# Patient Record
Sex: FEMALE | Race: ASIAN | NOT HISPANIC OR LATINO | Employment: STUDENT | URBAN - METROPOLITAN AREA
[De-identification: names, ages, dates, MRNs, and addresses within clinical notes are randomized per-mention and may not be internally consistent; named-entity substitution may affect disease eponyms.]

---

## 2024-02-22 ENCOUNTER — TELEPHONE (OUTPATIENT)
Dept: OBSTETRICS AND GYNECOLOGY | Facility: CLINIC | Age: 29
End: 2024-02-22
Payer: COMMERCIAL

## 2024-02-22 DIAGNOSIS — O36.80X0 PREGNANCY WITH INCONCLUSIVE FETAL VIABILITY (HHS-HCC): ICD-10-CM

## 2024-02-22 NOTE — TELEPHONE ENCOUNTER
Patient is scheduled for a New Patient NOB appointment with Dr. Fatima on 3/13 @ 12:30pm. Her LMP was 12/12. I offered the patient an earlier appointment with a different doctor but they wanted to wait for Dr. Fatima. Patient is experiencing a lot of nausea so they are not sure what she can take for that. She also would like to know what kind of medicine she needs to be taking for pregnancy since this is her first time.     Patients , Antoine, does the talking for the patient as she has a hard time talking english, but can understand it.

## 2024-02-23 DIAGNOSIS — O21.9 NAUSEA AND VOMITING IN PREGNANCY PRIOR TO 22 WEEKS GESTATION (HHS-HCC): ICD-10-CM

## 2024-02-23 DIAGNOSIS — O36.80X0 PREGNANCY WITH INCONCLUSIVE FETAL VIABILITY, SINGLE OR UNSPECIFIED FETUS (HHS-HCC): Primary | ICD-10-CM

## 2024-02-23 RX ORDER — ASCORBIC ACID, CHOLECALCIFEROL, .ALPHA.-TOCOPHEROL, DL-, FOLIC ACID, PYRIDOXINE HYDROCHLORIDE, CYANOCOBALAMIN, CALCIUM FORMATE, FERROUS ASPARTO GLYCINATE, MAGNESIUM OXIDE AND DOCONEXENT 90; 400; 40; 1; 26; 25; 155; 18; 50; 300 MG/1; [IU]/1; [IU]/1; MG/1; MG/1; UG/1; MG/1; MG/1; MG/1; MG/1
1 CAPSULE, GELATIN COATED ORAL DAILY
Qty: 90 CAPSULE | Refills: 3 | Status: SHIPPED | OUTPATIENT
Start: 2024-02-23

## 2024-02-23 RX ORDER — DOXYLAMINE SUCCINATE AND PYRIDOXINE HYDROCHLORIDE, DELAYED RELEASE TABLETS 10 MG/10 MG 10; 10 MG/1; MG/1
2 TABLET, DELAYED RELEASE ORAL NIGHTLY
Qty: 100 TABLET | Refills: 3 | Status: SHIPPED | OUTPATIENT
Start: 2024-02-23

## 2024-02-23 NOTE — TELEPHONE ENCOUNTER
Spoke with patient's , states Carol is experiencing nausea without vomiting.  Discussed over the counter vitamin B6 100 mg and unisom one each at bedtime.  Discussed also over the counter prenatal vitamin.  He would like prescriptions sent for nausea as well as prenatal vitamin.  Pharmacy and allergies verified.

## 2024-02-28 ENCOUNTER — TELEPHONE (OUTPATIENT)
Dept: OBSTETRICS AND GYNECOLOGY | Facility: CLINIC | Age: 29
End: 2024-02-28
Payer: COMMERCIAL

## 2024-02-28 NOTE — TELEPHONE ENCOUNTER
Patient would like to know what dosage of folic acid she should be taking for her pregnancy. She would also like to know if this can be called in or if she needs to get it over the counter. Please advise.

## 2024-03-08 ENCOUNTER — HOSPITAL ENCOUNTER (OUTPATIENT)
Dept: RADIOLOGY | Facility: CLINIC | Age: 29
Discharge: HOME | End: 2024-03-08
Payer: COMMERCIAL

## 2024-03-08 DIAGNOSIS — Z34.90 UNCERTAIN DATES, ANTEPARTUM (HHS-HCC): ICD-10-CM

## 2024-03-08 DIAGNOSIS — O36.80X0 PREGNANCY WITH INCONCLUSIVE FETAL VIABILITY (HHS-HCC): ICD-10-CM

## 2024-03-08 PROCEDURE — 76801 OB US < 14 WKS SINGLE FETUS: CPT | Performed by: OBSTETRICS & GYNECOLOGY

## 2024-03-08 PROCEDURE — 76813 OB US NUCHAL MEAS 1 GEST: CPT

## 2024-03-08 PROCEDURE — 76801 OB US < 14 WKS SINGLE FETUS: CPT

## 2024-03-08 PROCEDURE — 76813 OB US NUCHAL MEAS 1 GEST: CPT | Performed by: OBSTETRICS & GYNECOLOGY

## 2024-03-13 ENCOUNTER — APPOINTMENT (OUTPATIENT)
Dept: OBSTETRICS AND GYNECOLOGY | Facility: CLINIC | Age: 29
End: 2024-03-13
Payer: COMMERCIAL

## 2024-03-13 ENCOUNTER — INITIAL PRENATAL (OUTPATIENT)
Dept: OBSTETRICS AND GYNECOLOGY | Facility: CLINIC | Age: 29
End: 2024-03-13
Payer: COMMERCIAL

## 2024-03-13 ENCOUNTER — LAB (OUTPATIENT)
Dept: LAB | Facility: LAB | Age: 29
End: 2024-03-13
Payer: COMMERCIAL

## 2024-03-13 VITALS
HEIGHT: 66 IN | DIASTOLIC BLOOD PRESSURE: 76 MMHG | BODY MASS INDEX: 23.14 KG/M2 | WEIGHT: 144 LBS | SYSTOLIC BLOOD PRESSURE: 118 MMHG

## 2024-03-13 DIAGNOSIS — Z3A.13 13 WEEKS GESTATION OF PREGNANCY (HHS-HCC): ICD-10-CM

## 2024-03-13 DIAGNOSIS — O09.612 YOUNG PRIMIGRAVIDA IN SECOND TRIMESTER (HHS-HCC): Primary | ICD-10-CM

## 2024-03-13 DIAGNOSIS — O09.612 YOUNG PRIMIGRAVIDA IN SECOND TRIMESTER (HHS-HCC): ICD-10-CM

## 2024-03-13 LAB
25(OH)D3 SERPL-MCNC: 25 NG/ML (ref 30–100)
ABO GROUP (TYPE) IN BLOOD: NORMAL
ALBUMIN SERPL BCP-MCNC: 4.3 G/DL (ref 3.4–5)
ALP SERPL-CCNC: 47 U/L (ref 33–110)
ALT SERPL W P-5'-P-CCNC: 11 U/L (ref 7–45)
ANION GAP SERPL CALC-SCNC: 9 MMOL/L (ref 10–20)
ANTIBODY SCREEN: NORMAL
AST SERPL W P-5'-P-CCNC: 13 U/L (ref 9–39)
BILIRUB SERPL-MCNC: 0.3 MG/DL (ref 0–1.2)
BUN SERPL-MCNC: 10 MG/DL (ref 6–23)
CALCIUM SERPL-MCNC: 8.7 MG/DL (ref 8.6–10.3)
CHLORIDE SERPL-SCNC: 105 MMOL/L (ref 98–107)
CO2 SERPL-SCNC: 25 MMOL/L (ref 21–32)
CREAT SERPL-MCNC: 0.6 MG/DL (ref 0.5–1.05)
EGFRCR SERPLBLD CKD-EPI 2021: >90 ML/MIN/1.73M*2
ERYTHROCYTE [DISTWIDTH] IN BLOOD BY AUTOMATED COUNT: 13.2 % (ref 11.5–14.5)
GLUCOSE SERPL-MCNC: 84 MG/DL (ref 74–99)
HCT VFR BLD AUTO: 40.8 % (ref 36–46)
HGB BLD-MCNC: 13.6 G/DL (ref 12–16)
MCH RBC QN AUTO: 30.4 PG (ref 26–34)
MCHC RBC AUTO-ENTMCNC: 33.3 G/DL (ref 32–36)
MCV RBC AUTO: 91 FL (ref 80–100)
NRBC BLD-RTO: 0 /100 WBCS (ref 0–0)
PLATELET # BLD AUTO: 245 X10*3/UL (ref 150–450)
POTASSIUM SERPL-SCNC: 4.1 MMOL/L (ref 3.5–5.3)
PROT SERPL-MCNC: 6.6 G/DL (ref 6.4–8.2)
RBC # BLD AUTO: 4.47 X10*6/UL (ref 4–5.2)
RH FACTOR (ANTIGEN D): NORMAL
SODIUM SERPL-SCNC: 135 MMOL/L (ref 136–145)
WBC # BLD AUTO: 7.9 X10*3/UL (ref 4.4–11.3)

## 2024-03-13 PROCEDURE — 36415 COLL VENOUS BLD VENIPUNCTURE: CPT

## 2024-03-13 PROCEDURE — 86850 RBC ANTIBODY SCREEN: CPT

## 2024-03-13 PROCEDURE — 88141 CYTOPATH C/V INTERPRET: CPT | Performed by: STUDENT IN AN ORGANIZED HEALTH CARE EDUCATION/TRAINING PROGRAM

## 2024-03-13 PROCEDURE — 86900 BLOOD TYPING SEROLOGIC ABO: CPT

## 2024-03-13 PROCEDURE — 85027 COMPLETE CBC AUTOMATED: CPT

## 2024-03-13 PROCEDURE — 0500F INITIAL PRENATAL CARE VISIT: CPT | Performed by: OBSTETRICS & GYNECOLOGY

## 2024-03-13 PROCEDURE — 87389 HIV-1 AG W/HIV-1&-2 AB AG IA: CPT

## 2024-03-13 PROCEDURE — 88175 CYTOPATH C/V AUTO FLUID REDO: CPT

## 2024-03-13 PROCEDURE — 86780 TREPONEMA PALLIDUM: CPT

## 2024-03-13 PROCEDURE — 86901 BLOOD TYPING SEROLOGIC RH(D): CPT

## 2024-03-13 PROCEDURE — 87086 URINE CULTURE/COLONY COUNT: CPT

## 2024-03-13 PROCEDURE — 86317 IMMUNOASSAY INFECTIOUS AGENT: CPT

## 2024-03-13 PROCEDURE — 80053 COMPREHEN METABOLIC PANEL: CPT

## 2024-03-13 PROCEDURE — 82306 VITAMIN D 25 HYDROXY: CPT

## 2024-03-13 PROCEDURE — 87624 HPV HI-RISK TYP POOLED RSLT: CPT

## 2024-03-13 PROCEDURE — 87800 DETECT AGNT MULT DNA DIREC: CPT

## 2024-03-13 PROCEDURE — 87340 HEPATITIS B SURFACE AG IA: CPT

## 2024-03-13 NOTE — PROGRESS NOTES
Subjective   Patient ID [id number]   Carol Ortez is a 28 y.o.  at 13w1d with a working estimated date of delivery of 2024, by Last Menstrual Period who presents for an initial prenatal visit. This pregnancy is planned.    Her pregnancy is complicated by:  none    OB History    Para Term  AB Living   1             SAB IAB Ectopic Multiple Live Births                  # Outcome Date GA Lbr Levi/2nd Weight Sex Delivery Anes PTL Lv   1 Current                   Objective   Physical Exam  Weight: 65.3 kg (144 lb)  Expected Total Weight Gain: 11.5 kg (25 lb)-16 kg (35 lb)   Pregravid BMI: 20.55  BP: 118/76          Physical Exam  Constitutional:       Appearance: Normal appearance.   Genitourinary:      Bladder, rectum and urethral meatus normal.      Right Labia: No rash or lesions.     Left Labia: No lesions or rash.     No vaginal discharge.      No vaginal prolapse present.     No vaginal atrophy present.       Right Adnexa: not tender and no mass present.     Left Adnexa: not tender and no mass present.     No cervical discharge or lesion.      Uterus is enlarged.      Pelvic exam was performed with patient in the lithotomy position.   Breasts:     Breasts are soft.     Right: Normal.      Left: Normal.   HENT:      Head: Normocephalic and atraumatic.      Nose: Nose normal.   Cardiovascular:      Rate and Rhythm: Normal rate and regular rhythm.      Heart sounds: Normal heart sounds.   Pulmonary:      Effort: Pulmonary effort is normal.      Breath sounds: Normal breath sounds.   Abdominal:      Palpations: Abdomen is soft.   Musculoskeletal:      Cervical back: Neck supple.   Neurological:      General: No focal deficit present.      Mental Status: She is alert and oriented to person, place, and time.   Skin:     General: Skin is warm and dry.      Findings: No rash.   Psychiatric:         Mood and Affect: Mood normal.       Prenatal Labs  Are ordered    Problem List Items Addressed  This Visit       Young primigravida in second trimester - Primary    Overview     Low risk pregnancy.   Desires NIPS. Normal 13 week early anatomy usn and NT.         13 weeks gestation of pregnancy        Prenatal Labs ordered. She also requests calciun and vitamin D levels since she was told she had low levels in the past.  Daily prenatal vitamins are recommended.  First trimester screening and second trimester screening discussed. Patient decided to proceed with NIPS.  Follow up in 4 weeks for return OB visit.

## 2024-03-14 PROBLEM — E55.9 VITAMIN D DEFICIENCY: Status: ACTIVE | Noted: 2024-03-14

## 2024-03-14 LAB
BACTERIA UR CULT: NO GROWTH
HBV SURFACE AG SERPL QL IA: NONREACTIVE
HIV 1+2 AB+HIV1 P24 AG SERPL QL IA: NONREACTIVE
REFLEX ADDED, ANEMIA PANEL: NORMAL
RUBV IGG SERPL IA-ACNC: 2.9 IA
RUBV IGG SERPL QL IA: POSITIVE
TREPONEMA PALLIDUM IGG+IGM AB [PRESENCE] IN SERUM OR PLASMA BY IMMUNOASSAY: NONREACTIVE

## 2024-03-15 LAB
C TRACH RRNA SPEC QL NAA+PROBE: NEGATIVE
N GONORRHOEA DNA SPEC QL PROBE+SIG AMP: NEGATIVE

## 2024-03-22 LAB — SCAN RESULT: NORMAL

## 2024-03-27 PROBLEM — R87.810 ASCUS WITH POSITIVE HIGH RISK HPV CERVICAL: Status: ACTIVE | Noted: 2024-03-27

## 2024-03-27 PROBLEM — R87.610 ASCUS WITH POSITIVE HIGH RISK HPV CERVICAL: Status: ACTIVE | Noted: 2024-03-27

## 2024-03-27 LAB
CYTOLOGY CMNT CVX/VAG CYTO-IMP: NORMAL
HPV HR 12 DNA GENITAL QL NAA+PROBE: POSITIVE
HPV HR GENOTYPES PNL CVX NAA+PROBE: POSITIVE
HPV16 DNA SPEC QL NAA+PROBE: NEGATIVE
HPV18 DNA SPEC QL NAA+PROBE: NEGATIVE
LAB AP HPV GENOTYPE QUESTION: YES
LAB AP HPV HR: NORMAL
LAB AP PAP ADDITIONAL TESTS: NORMAL
LABORATORY COMMENT REPORT: NORMAL
LMP START DATE: NORMAL
MENSTRUAL HX REPORTED: NORMAL
PATH REPORT.TOTAL CANCER: NORMAL

## 2024-04-12 PROBLEM — Z3A.18 18 WEEKS GESTATION OF PREGNANCY (HHS-HCC): Status: ACTIVE | Noted: 2024-03-13

## 2024-04-17 ENCOUNTER — ROUTINE PRENATAL (OUTPATIENT)
Dept: OBSTETRICS AND GYNECOLOGY | Facility: CLINIC | Age: 29
End: 2024-04-17
Payer: COMMERCIAL

## 2024-04-17 VITALS — SYSTOLIC BLOOD PRESSURE: 110 MMHG | BODY MASS INDEX: 24.11 KG/M2 | DIASTOLIC BLOOD PRESSURE: 78 MMHG | WEIGHT: 150 LBS

## 2024-04-17 DIAGNOSIS — R87.610 ASCUS WITH POSITIVE HIGH RISK HPV CERVICAL: Primary | ICD-10-CM

## 2024-04-17 DIAGNOSIS — Z3A.18 18 WEEKS GESTATION OF PREGNANCY (HHS-HCC): ICD-10-CM

## 2024-04-17 DIAGNOSIS — R87.810 ASCUS WITH POSITIVE HIGH RISK HPV CERVICAL: Primary | ICD-10-CM

## 2024-04-17 DIAGNOSIS — N76.0 BACTERIAL VAGINOSIS: ICD-10-CM

## 2024-04-17 DIAGNOSIS — B96.89 BACTERIAL VAGINOSIS: ICD-10-CM

## 2024-04-17 PROCEDURE — 57452 EXAM OF CERVIX W/SCOPE: CPT | Performed by: OBSTETRICS & GYNECOLOGY

## 2024-04-17 PROCEDURE — 0501F PRENATAL FLOW SHEET: CPT | Performed by: OBSTETRICS & GYNECOLOGY

## 2024-04-17 RX ORDER — METRONIDAZOLE 7.5 MG/G
GEL VAGINAL DAILY
Qty: 70 G | Refills: 0 | Status: SHIPPED | OUTPATIENT
Start: 2024-04-17 | End: 2024-04-22

## 2024-04-17 NOTE — PROGRESS NOTES
"Patient ID: Carol Ortez is a 29 y.o. female.    Colposcopy    Date/Time: 2024 4:05 PM    Performed by: Brianne Fatima MD  Authorized by: Brianne Fatima MD    Procedure location: cervix    Consent:     Patient questions answered: yes      Risks and benefits of the procedure and its alternatives discussed: yes      Procedural risks discussed:  Bleeding and infection    Consent obtained:  Written    Consent given by:  Patient  Indication:     Cervical indication(s): high-risk HPV positive and ASCUS    Pre-procedure:     Speculum was placed in the vagina: yes      Prep solution(s): acetic acid    Procedure:     Colposcopy with: colposcopy only      Cervix visibility: fully visualized      SCJ visibility: fully visualized      Lesion visualized: fully visualized      Acetowhite lesion(s): cervix      Acetowhite lesion(s) description:  11:00 small area of AWE  Post-procedure:     Patient tolerance of procedure:  Patient tolerated the procedure well with no immediate complications  Subjective   Patient ID [id number]   Carol Ortez is a 29 y.o.   at 18w1d with a working estimated date of delivery of 2024, by Last Menstrual Period who presents for a routine prenatal visit. She denies vaginal bleeding, leakage of fluid, decreased fetal movements, or contractions.  Colposcopy is planned for today due to ASCUS pap with HPV present.     Objective   Physical Exam  Weight: 68 kg (150 lb)  Expected Total Weight Gain: 11.5 kg (25 lb)-16 kg (35 lb)   Pregravid BMI: 20.55  BP: 110/78         Prenatal Labs  Urine dip:  No results found for: \"KETONESU\", \"GLUCOSEUR\", \"LEUKOCYTESUR\"    Lab Results   Component Value Date    HGB 13.6 2024    HCT 40.8 2024    ABO O 2024    HEPBSAG Nonreactive 2024         Problem List Items Addressed This Visit       [facility]    ASCUS with positive high risk HPV cervical - Primary    Overview     Pap 3/13/2024 " returned with ASCUS, HPV present when she was 13 weeks gestation.  Colposcopy is performed 4/17/2024.              Continue prenatal vitamin.  Labs reviewed.  Metrogel vaginal was prescribed for evidence of BV on pap. No biopsy was performed on cervix and pap is planned for postpartum.  Follow up as scheduled for a routine prenatal visit.

## 2024-04-26 ENCOUNTER — HOSPITAL ENCOUNTER (OUTPATIENT)
Dept: RADIOLOGY | Facility: CLINIC | Age: 29
Discharge: HOME | End: 2024-04-26
Payer: COMMERCIAL

## 2024-04-26 DIAGNOSIS — O36.80X0 PREGNANCY WITH INCONCLUSIVE FETAL VIABILITY (HHS-HCC): ICD-10-CM

## 2024-04-26 PROCEDURE — 76805 OB US >/= 14 WKS SNGL FETUS: CPT

## 2024-04-26 PROCEDURE — 76805 OB US >/= 14 WKS SNGL FETUS: CPT | Performed by: OBSTETRICS & GYNECOLOGY

## 2024-05-28 PROBLEM — Z3A.24 24 WEEKS GESTATION OF PREGNANCY (HHS-HCC): Status: ACTIVE | Noted: 2024-03-13

## 2024-05-29 ENCOUNTER — ROUTINE PRENATAL (OUTPATIENT)
Dept: OBSTETRICS AND GYNECOLOGY | Facility: CLINIC | Age: 29
End: 2024-05-29
Payer: COMMERCIAL

## 2024-05-29 ENCOUNTER — LAB (OUTPATIENT)
Dept: LAB | Facility: LAB | Age: 29
End: 2024-05-29
Payer: COMMERCIAL

## 2024-05-29 VITALS — BODY MASS INDEX: 26.84 KG/M2 | WEIGHT: 167 LBS | SYSTOLIC BLOOD PRESSURE: 120 MMHG | DIASTOLIC BLOOD PRESSURE: 80 MMHG

## 2024-05-29 DIAGNOSIS — Z34.02 PRIMIGRAVIDA IN SECOND TRIMESTER (HHS-HCC): ICD-10-CM

## 2024-05-29 DIAGNOSIS — O09.612 YOUNG PRIMIGRAVIDA IN SECOND TRIMESTER (HHS-HCC): Primary | ICD-10-CM

## 2024-05-29 DIAGNOSIS — Z3A.24 24 WEEKS GESTATION OF PREGNANCY (HHS-HCC): ICD-10-CM

## 2024-05-29 DIAGNOSIS — L30.8 OTHER ECZEMA: ICD-10-CM

## 2024-05-29 LAB
ERYTHROCYTE [DISTWIDTH] IN BLOOD BY AUTOMATED COUNT: 13.5 % (ref 11.5–14.5)
GLUCOSE 1H P 50 G GLC PO SERPL-MCNC: 122 MG/DL
HCT VFR BLD AUTO: 36.3 % (ref 36–46)
HGB BLD-MCNC: 12.3 G/DL (ref 12–16)
MCH RBC QN AUTO: 32 PG (ref 26–34)
MCHC RBC AUTO-ENTMCNC: 33.9 G/DL (ref 32–36)
MCV RBC AUTO: 95 FL (ref 80–100)
NRBC BLD-RTO: 0 /100 WBCS (ref 0–0)
PLATELET # BLD AUTO: 236 X10*3/UL (ref 150–450)
RBC # BLD AUTO: 3.84 X10*6/UL (ref 4–5.2)
WBC # BLD AUTO: 10.9 X10*3/UL (ref 4.4–11.3)

## 2024-05-29 PROCEDURE — 36415 COLL VENOUS BLD VENIPUNCTURE: CPT

## 2024-05-29 PROCEDURE — 0501F PRENATAL FLOW SHEET: CPT | Performed by: OBSTETRICS & GYNECOLOGY

## 2024-05-29 PROCEDURE — 82947 ASSAY GLUCOSE BLOOD QUANT: CPT

## 2024-05-29 PROCEDURE — 86780 TREPONEMA PALLIDUM: CPT

## 2024-05-29 PROCEDURE — 85027 COMPLETE CBC AUTOMATED: CPT

## 2024-05-29 RX ORDER — TRIAMCINOLONE ACETONIDE 0.25 MG/G
OINTMENT TOPICAL 2 TIMES DAILY
Qty: 15 G | Refills: 0 | Status: SHIPPED | OUTPATIENT
Start: 2024-05-29

## 2024-05-29 NOTE — PROGRESS NOTES
"Subjective   Patient ID [id number]   Carol Ortez is a 29 y.o.   at 24w1d with a working estimated date of delivery of 2024, by Last Menstrual Period who presents for a routine prenatal visit. She denies vaginal bleeding, leakage of fluid, decreased fetal movements, or contractions.  She has had an itchy area on the left areola for several months. This is not improved with moisturizer and she denies any rash or redness. We discussed trying a steroid ointment. She has had some low back pain, hip pain and possibly a hemorrhoid. We discussed need to assure adequate hydration, avoid constipation, and starting an exercise/stretching program may be helpful. We also discussed weight gain and recommendation to decrease carbohydrate intake to help reduce future weight gain. Many questions were answered.     Objective   Physical Exam  Weight: 75.8 kg (167 lb)  Expected Total Weight Gain: 11.5 kg (25 lb)-16 kg (35 lb)   Pregravid BMI: 20.55  BP: 120/80  Fetal Heart Rate: 150 Fundal Height (cm): 24 cm  Left lateral areola shows minimal thickening to skin with no erythema.   Prenatal Labs  Urine dip:  No results found for: \"KETONESU\", \"GLUCOSEUR\", \"LEUKOCYTESUR\"    Lab Results   Component Value Date    HGB 13.6 2024    HCT 40.8 2024    ABO O 2024    HEPBSAG Nonreactive 2024         Problem List Items Addressed This Visit       24 weeks gestation of pregnancy ([facility])    Young primigravida in second trimester ([facility]) - Primary    Overview     Low risk pregnancy.   NIPS returned risk reducing. Normal 13 week early anatomy usn and NT.          Other Visit Diagnoses       Primigravida in second trimester ([facility])        Relevant Orders    Glucose, 1 Hour Screen, Pregnancy    Syphilis Screen with Reflex    CBC Anemia Panel With Reflex,Pregnancy    Other eczema        Relevant Medications    triamcinolone (Kenalog) 0.025 % ointment             Continue prenatal vitamin.  Labs " reviewed.  Nain today.  Triamcinolone was prescribed to apply to rash/itchy area of left breast.   Follow up as scheduled for a routine prenatal visit.

## 2024-05-30 LAB
REFLEX ADDED, ANEMIA PANEL: NORMAL
TREPONEMA PALLIDUM IGG+IGM AB [PRESENCE] IN SERUM OR PLASMA BY IMMUNOASSAY: NONREACTIVE

## 2024-06-26 ENCOUNTER — APPOINTMENT (OUTPATIENT)
Dept: OBSTETRICS AND GYNECOLOGY | Facility: CLINIC | Age: 29
End: 2024-06-26
Payer: COMMERCIAL

## 2024-06-26 VITALS — BODY MASS INDEX: 27.16 KG/M2 | SYSTOLIC BLOOD PRESSURE: 122 MMHG | WEIGHT: 169 LBS | DIASTOLIC BLOOD PRESSURE: 88 MMHG

## 2024-06-26 DIAGNOSIS — Z3A.28 28 WEEKS GESTATION OF PREGNANCY (HHS-HCC): ICD-10-CM

## 2024-06-26 DIAGNOSIS — O09.612 YOUNG PRIMIGRAVIDA IN SECOND TRIMESTER (HHS-HCC): Primary | ICD-10-CM

## 2024-06-26 PROCEDURE — 0501F PRENATAL FLOW SHEET: CPT | Performed by: OBSTETRICS & GYNECOLOGY

## 2024-06-26 NOTE — PROGRESS NOTES
"Subjective   Patient ID [id number]   Carol Ortez is a 29 y.o.   at 28w1d with a working estimated date of delivery of 2024, by Last Menstrual Period who presents for a routine prenatal visit. She denies vaginal bleeding, leakage of fluid, decreased fetal movements, or contractions.  They wish to research Tdap prior to receiving. We also discussed delivery at [facility].    Objective   Physical Exam  Weight: 76.7 kg (169 lb)  Expected Total Weight Gain: 11.5 kg (25 lb)-16 kg (35 lb)   Pregravid BMI: 20.55  BP: 122/88         Prenatal Labs  Urine dip:  No results found for: \"KETONESU\", \"GLUCOSEUR\", \"LEUKOCYTESUR\"    Lab Results   Component Value Date    HGB 12.3 2024    HCT 36.3 2024    ABO O 2024    HEPBSAG Nonreactive 2024         Problem List Items Addressed This Visit       Young primigravida in second trimester ([facility]) - Primary    Overview     Low risk pregnancy.   NIPS returned risk reducing. Normal 13 week early anatomy usn and NT.         28 weeks gestation of pregnancy ([facility])    Overview     Glucola 122.             Continue prenatal vitamin.  Labs reviewed.    Follow up as scheduled for a routine prenatal visit.  "

## 2024-07-08 PROBLEM — Z3A.30 30 WEEKS GESTATION OF PREGNANCY (HHS-HCC): Status: ACTIVE | Noted: 2024-03-13

## 2024-07-10 ENCOUNTER — APPOINTMENT (OUTPATIENT)
Dept: OBSTETRICS AND GYNECOLOGY | Facility: CLINIC | Age: 29
End: 2024-07-10
Payer: COMMERCIAL

## 2024-07-10 ENCOUNTER — ROUTINE PRENATAL (OUTPATIENT)
Dept: OBSTETRICS AND GYNECOLOGY | Facility: CLINIC | Age: 29
End: 2024-07-10
Payer: COMMERCIAL

## 2024-07-10 VITALS — WEIGHT: 173 LBS | SYSTOLIC BLOOD PRESSURE: 110 MMHG | DIASTOLIC BLOOD PRESSURE: 78 MMHG | BODY MASS INDEX: 27.8 KG/M2

## 2024-07-10 DIAGNOSIS — O36.5990 POOR FETAL GROWTH AFFECTING MANAGEMENT OF MOTHER, ANTEPARTUM, SINGLE OR UNSPECIFIED FETUS (HHS-HCC): ICD-10-CM

## 2024-07-10 DIAGNOSIS — O09.613 YOUNG PRIMIGRAVIDA IN THIRD TRIMESTER (HHS-HCC): ICD-10-CM

## 2024-07-10 DIAGNOSIS — Z3A.30 30 WEEKS GESTATION OF PREGNANCY (HHS-HCC): Primary | ICD-10-CM

## 2024-07-10 PROCEDURE — 0501F PRENATAL FLOW SHEET: CPT | Performed by: OBSTETRICS & GYNECOLOGY

## 2024-07-10 PROCEDURE — 90471 IMMUNIZATION ADMIN: CPT | Performed by: OBSTETRICS & GYNECOLOGY

## 2024-07-10 PROCEDURE — 90715 TDAP VACCINE 7 YRS/> IM: CPT | Performed by: OBSTETRICS & GYNECOLOGY

## 2024-07-10 NOTE — PROGRESS NOTES
"Subjective   Patient ID [id number]   Carol Ortez is a 29 y.o.   at 30w1d with a working estimated date of delivery of 2024, by Last Menstrual Period who presents for a routine prenatal visit. She denies vaginal bleeding, leakage of fluid, decreased fetal movements, or contractions.  She notes some back pain but feels this is likely due to poor posture and body mechanics from studying most of the day. One time she stumbled and had acid reflux as a result, but she denies any ongoing issue with reflux.     Objective   Physical Exam  Weight: 78.5 kg (173 lb)  Expected Total Weight Gain: 11.5 kg (25 lb)-16 kg (35 lb)   Pregravid BMI: 20.55  BP: 110/78  Fetal Heart Rate: 140 Fundal Height (cm): 29 cm    Prenatal Labs  Urine dip:  No results found for: \"KETONESU\", \"GLUCOSEUR\", \"LEUKOCYTESUR\"    Lab Results   Component Value Date    HGB 12.3 2024    HCT 36.3 2024    ABO O 2024    HEPBSAG Nonreactive 2024         Problem List Items Addressed This Visit       Young primigravida in third trimester ([facility]) - Primary    Overview     Low risk pregnancy.   NIPS returned risk reducing. Normal 13 week early anatomy usn and NT.         SGA (small for gestational age), fetal, affecting care of mother, antepartum ([facility])    Overview     Will get EFW for SGA.         30 weeks gestation of pregnancy ([facility])    Overview     Glucola 122.             Continue prenatal vitamin.  Labs reviewed.  She declines pelvic floor PT for back pain and will try stretches and improved body mechanics.   Tdap today.  Follow up as scheduled for a routine prenatal visit.  "

## 2024-07-16 ENCOUNTER — HOSPITAL ENCOUNTER (OUTPATIENT)
Dept: RADIOLOGY | Facility: CLINIC | Age: 29
Discharge: HOME | End: 2024-07-16
Payer: COMMERCIAL

## 2024-07-16 DIAGNOSIS — O36.80X0 PREGNANCY WITH INCONCLUSIVE FETAL VIABILITY (HHS-HCC): ICD-10-CM

## 2024-07-16 PROCEDURE — 76816 OB US FOLLOW-UP PER FETUS: CPT

## 2024-07-16 PROCEDURE — 76816 OB US FOLLOW-UP PER FETUS: CPT | Performed by: OBSTETRICS & GYNECOLOGY

## 2024-07-23 PROBLEM — Z3A.32 32 WEEKS GESTATION OF PREGNANCY (HHS-HCC): Status: ACTIVE | Noted: 2024-03-13

## 2024-07-24 ENCOUNTER — ROUTINE PRENATAL (OUTPATIENT)
Dept: OBSTETRICS AND GYNECOLOGY | Facility: CLINIC | Age: 29
End: 2024-07-24
Payer: COMMERCIAL

## 2024-07-24 ENCOUNTER — APPOINTMENT (OUTPATIENT)
Dept: OBSTETRICS AND GYNECOLOGY | Facility: CLINIC | Age: 29
End: 2024-07-24
Payer: COMMERCIAL

## 2024-07-24 VITALS — SYSTOLIC BLOOD PRESSURE: 120 MMHG | WEIGHT: 177.5 LBS | DIASTOLIC BLOOD PRESSURE: 88 MMHG | BODY MASS INDEX: 28.53 KG/M2

## 2024-07-24 DIAGNOSIS — Z3A.32 32 WEEKS GESTATION OF PREGNANCY (HHS-HCC): ICD-10-CM

## 2024-07-24 DIAGNOSIS — O09.613 YOUNG PRIMIGRAVIDA IN THIRD TRIMESTER (HHS-HCC): Primary | ICD-10-CM

## 2024-07-24 PROBLEM — O36.5990 SGA (SMALL FOR GESTATIONAL AGE), FETAL, AFFECTING CARE OF MOTHER, ANTEPARTUM (HHS-HCC): Status: RESOLVED | Noted: 2024-07-10 | Resolved: 2024-07-24

## 2024-07-24 PROCEDURE — 0501F PRENATAL FLOW SHEET: CPT | Performed by: OBSTETRICS & GYNECOLOGY

## 2024-07-24 NOTE — PROGRESS NOTES
"Subjective   Patient ID [id number]   Carol Ortez is a 29 y.o.   at 32w1d with a working estimated date of delivery of 2024, by Last Menstrual Period who presents for a routine prenatal visit. She denies vaginal bleeding, leakage of fluid, decreased fetal movements, or contractions.  She feels a rhythmic movement at times. We discussed this could be hiccups.  She has a lesion on the labia for the past week. She feels this could be from shaving.        Objective   Physical Exam  Weight: 80.5 kg (177 lb 8 oz)  Expected Total Weight Gain: 11.5 kg (25 lb)-16 kg (35 lb)   Pregravid BMI: 20.55  BP: 120/88  Fetal Heart Rate: 140 Fundal Height (cm): 31 cm  Left labia majora has a raised round lesion with central defect with appearance most consistent with inflamed skin tag or raised furuncle.     Prenatal Labs  Urine dip:  No results found for: \"KETONESU\", \"GLUCOSEUR\", \"LEUKOCYTESUR\"    Lab Results   Component Value Date    HGB 12.3 2024    HCT 36.3 2024    ABO O 2024    HEPBSAG Nonreactive 2024         Problem List Items Addressed This Visit       Young primigravida in third trimester ([facility]) - Primary    Overview     Low risk pregnancy.   NIPS returned risk reducing. Normal 13 week early anatomy usn and NT.         32 weeks gestation of pregnancy ([facility])    Overview     Glucola 122.  31 week EFW 1808g, 60%.              Continue prenatal vitamin.  Labs reviewed.  She was advised to apply heat to the skin lesion. If worsening she will need to return for evaluation and possible excision or drainage.   Follow up as scheduled for a routine prenatal visit.  "

## 2024-08-07 ENCOUNTER — APPOINTMENT (OUTPATIENT)
Dept: OBSTETRICS AND GYNECOLOGY | Facility: CLINIC | Age: 29
End: 2024-08-07
Payer: COMMERCIAL

## 2024-08-08 ENCOUNTER — APPOINTMENT (OUTPATIENT)
Dept: OBSTETRICS AND GYNECOLOGY | Facility: CLINIC | Age: 29
End: 2024-08-08
Payer: COMMERCIAL

## 2024-08-12 ENCOUNTER — ROUTINE PRENATAL (OUTPATIENT)
Dept: OBSTETRICS AND GYNECOLOGY | Facility: CLINIC | Age: 29
End: 2024-08-12
Payer: COMMERCIAL

## 2024-08-12 VITALS — SYSTOLIC BLOOD PRESSURE: 116 MMHG | WEIGHT: 185 LBS | DIASTOLIC BLOOD PRESSURE: 82 MMHG | BODY MASS INDEX: 29.73 KG/M2

## 2024-08-12 DIAGNOSIS — Z3A.34 34 WEEKS GESTATION OF PREGNANCY (HHS-HCC): ICD-10-CM

## 2024-08-12 DIAGNOSIS — O36.8390 FETAL HEART RATE/RHYTHM ABNORMALITY AFFECTING MANAGEMENT OF MOTHER (HHS-HCC): Primary | ICD-10-CM

## 2024-08-12 DIAGNOSIS — O09.613 YOUNG PRIMIGRAVIDA IN THIRD TRIMESTER (HHS-HCC): ICD-10-CM

## 2024-08-12 PROCEDURE — 0501F PRENATAL FLOW SHEET: CPT | Performed by: OBSTETRICS & GYNECOLOGY

## 2024-08-12 PROCEDURE — 59025 FETAL NON-STRESS TEST: CPT | Performed by: OBSTETRICS & GYNECOLOGY

## 2024-08-12 NOTE — PROGRESS NOTES
"Subjective   Patient ID [id number]   Carol Ortez is a 29 y.o.  at 34w6d with a working estimated date of delivery of 2024, by Last Menstrual Period who presents for a routine prenatal visit. She denies vaginal bleeding, leakage of fluid, decreased fetal movements, or contractions.  She is here for NST today, due to complaint of fetal pulsation, approximately 50 to 60 bpm, 3-4 times a day, sometimes lasting for 20 minutes at a time.  She is unsure whether these are fetal hiccups.  She denies any other problems  Her pregnancy is uncomplicated       Objective   Physical Exam:   Weight: 83.9 kg (185 lb)  Expected Total Weight Gain: 11.5 kg (25 lb)-16 kg (35 lb)   Pregravid BMI: 20.55  BP: 116/82  Fetal Heart Rate: 138 Fundal Height (cm): 34 cm             Prenatal Labs  Urine Dip:  No results found for: \"KETONESU\", \"GLUCOSEUR\", \"LEUKOCYTESUR\"  Lab Results   Component Value Date    HGB 12.3 2024    HCT 36.3 2024    ABO O 2024    HEPBSAG Nonreactive 2024     No results found for: \"PAPPA\", \"AFP\", \"HCG\", \"ESTRIOL\", \"INHBA\"  No results found for: \"GLUF\", \"GLUT1\", \"QMYNWRE1ME\", \"FUJXVTV5GF\"    Imaging  The most recent ultrasound was performed on The most recent ultrasound study is not finalized with a study GA of The most recent ultrasound study is not finalized and EFW of The most recent ultrasound study is not finalized.  The most recent ultrasound study is not finalized  The most recent ultrasound study is not finalized    Assessment/Plan   Problem List Items Addressed This Visit             ICD-10-CM    Young primigravida in third trimester ([facility]) O09.613    34 weeks gestation of pregnancy ([facility]) Z3A.34    Fetal heart rate/rhythm abnormality affecting management of mother ([facility]) - Primary O36.8390    Relevant Orders    Fetal nonstress test, once     Continue prenatal vitamin.  RNST today, patient reassured  Expected mode of delivery   Follow up in 1 week for a routine " prenatal visit.

## 2024-08-12 NOTE — PROCEDURES
Carol Ortez, a  at 34w6d with an LUISA of 2024, by Last Menstrual Period, was seen at [facility] for a nonstress test.    Non-Stress Test   Baseline Fetal Heart Rate for Non-Stress Test: 138 BPM  Variability in Waveform for Non-Stress Test: Moderate  Accelerations in Non-Stress Test: Yes, greater than/equal to 15 bpm, lasting at least 15 seconds  Decelerations in Non-Stress Test: None  Contractions in Non-Stress Test: Not present  Acoustic Stimulator for Non-Stress Test: No  Interpretation of Non-Stress Test   Interpretation of Non-Stress Test: Reactive  NST for Multiple Fetuses: No

## 2024-08-14 ENCOUNTER — APPOINTMENT (OUTPATIENT)
Dept: OBSTETRICS AND GYNECOLOGY | Facility: CLINIC | Age: 29
End: 2024-08-14
Payer: COMMERCIAL

## 2024-08-19 PROBLEM — Z3A.36 36 WEEKS GESTATION OF PREGNANCY (HHS-HCC): Status: ACTIVE | Noted: 2024-03-13

## 2024-08-21 ENCOUNTER — APPOINTMENT (OUTPATIENT)
Dept: OBSTETRICS AND GYNECOLOGY | Facility: CLINIC | Age: 29
End: 2024-08-21
Payer: COMMERCIAL

## 2024-08-21 ENCOUNTER — HOSPITAL ENCOUNTER (OUTPATIENT)
Dept: RADIOLOGY | Facility: CLINIC | Age: 29
Discharge: HOME | End: 2024-08-21
Payer: COMMERCIAL

## 2024-08-21 ENCOUNTER — HOSPITAL ENCOUNTER (OUTPATIENT)
Facility: HOSPITAL | Age: 29
Discharge: HOME | End: 2024-08-21
Attending: OBSTETRICS & GYNECOLOGY | Admitting: OBSTETRICS & GYNECOLOGY
Payer: COMMERCIAL

## 2024-08-21 VITALS
SYSTOLIC BLOOD PRESSURE: 116 MMHG | RESPIRATION RATE: 18 BRPM | OXYGEN SATURATION: 99 % | WEIGHT: 185.96 LBS | BODY MASS INDEX: 29.19 KG/M2 | DIASTOLIC BLOOD PRESSURE: 77 MMHG | TEMPERATURE: 97.9 F | HEIGHT: 67 IN | HEART RATE: 71 BPM

## 2024-08-21 VITALS
WEIGHT: 186.5 LBS | SYSTOLIC BLOOD PRESSURE: 138 MMHG | DIASTOLIC BLOOD PRESSURE: 110 MMHG | BODY MASS INDEX: 29.97 KG/M2

## 2024-08-21 DIAGNOSIS — O36.80X0 PREGNANCY WITH INCONCLUSIVE FETAL VIABILITY (HHS-HCC): ICD-10-CM

## 2024-08-21 DIAGNOSIS — O09.613 YOUNG PRIMIGRAVIDA IN THIRD TRIMESTER (HHS-HCC): Primary | ICD-10-CM

## 2024-08-21 DIAGNOSIS — O13.3 GESTATIONAL HYPERTENSION, THIRD TRIMESTER (HHS-HCC): ICD-10-CM

## 2024-08-21 DIAGNOSIS — Z3A.36 36 WEEKS GESTATION OF PREGNANCY (HHS-HCC): ICD-10-CM

## 2024-08-21 PROBLEM — O36.8390: Status: RESOLVED | Noted: 2024-08-12 | Resolved: 2024-08-21

## 2024-08-21 LAB
ALBUMIN SERPL BCP-MCNC: 3.3 G/DL (ref 3.4–5)
ALP SERPL-CCNC: 150 U/L (ref 33–110)
ALT SERPL W P-5'-P-CCNC: 17 U/L (ref 7–45)
ANION GAP SERPL CALC-SCNC: 15 MMOL/L (ref 10–20)
AST SERPL W P-5'-P-CCNC: 21 U/L (ref 9–39)
BILIRUB SERPL-MCNC: 0.3 MG/DL (ref 0–1.2)
BUN SERPL-MCNC: 8 MG/DL (ref 6–23)
CALCIUM SERPL-MCNC: 8.9 MG/DL (ref 8.6–10.3)
CHLORIDE SERPL-SCNC: 105 MMOL/L (ref 98–107)
CO2 SERPL-SCNC: 19 MMOL/L (ref 21–32)
CREAT SERPL-MCNC: 0.57 MG/DL (ref 0.5–1.05)
CREAT UR-MCNC: 69.6 MG/DL (ref 20–320)
EGFRCR SERPLBLD CKD-EPI 2021: >90 ML/MIN/1.73M*2
ERYTHROCYTE [DISTWIDTH] IN BLOOD BY AUTOMATED COUNT: 13 % (ref 11.5–14.5)
GLUCOSE SERPL-MCNC: 65 MG/DL (ref 74–99)
HCT VFR BLD AUTO: 36.5 % (ref 36–46)
HGB BLD-MCNC: 12.1 G/DL (ref 12–16)
LDH SERPL L TO P-CCNC: 168 U/L (ref 84–246)
MCH RBC QN AUTO: 30.6 PG (ref 26–34)
MCHC RBC AUTO-ENTMCNC: 33.2 G/DL (ref 32–36)
MCV RBC AUTO: 92 FL (ref 80–100)
NRBC BLD-RTO: 0 /100 WBCS (ref 0–0)
PLATELET # BLD AUTO: 204 X10*3/UL (ref 150–450)
POTASSIUM SERPL-SCNC: 4.2 MMOL/L (ref 3.5–5.3)
PROT SERPL-MCNC: 6 G/DL (ref 6.4–8.2)
PROT UR-ACNC: 13 MG/DL (ref 5–24)
PROT/CREAT UR: 0.19 MG/MG CREAT (ref 0–0.17)
RBC # BLD AUTO: 3.95 X10*6/UL (ref 4–5.2)
SODIUM SERPL-SCNC: 135 MMOL/L (ref 136–145)
WBC # BLD AUTO: 10.3 X10*3/UL (ref 4.4–11.3)

## 2024-08-21 PROCEDURE — 76816 OB US FOLLOW-UP PER FETUS: CPT

## 2024-08-21 PROCEDURE — 76816 OB US FOLLOW-UP PER FETUS: CPT | Performed by: OBSTETRICS & GYNECOLOGY

## 2024-08-21 PROCEDURE — 85027 COMPLETE CBC AUTOMATED: CPT | Performed by: OBSTETRICS & GYNECOLOGY

## 2024-08-21 PROCEDURE — 83615 LACTATE (LD) (LDH) ENZYME: CPT | Performed by: OBSTETRICS & GYNECOLOGY

## 2024-08-21 PROCEDURE — 76819 FETAL BIOPHYS PROFIL W/O NST: CPT | Performed by: OBSTETRICS & GYNECOLOGY

## 2024-08-21 PROCEDURE — 82570 ASSAY OF URINE CREATININE: CPT | Performed by: OBSTETRICS & GYNECOLOGY

## 2024-08-21 PROCEDURE — 80053 COMPREHEN METABOLIC PANEL: CPT | Performed by: OBSTETRICS & GYNECOLOGY

## 2024-08-21 PROCEDURE — 87081 CULTURE SCREEN ONLY: CPT

## 2024-08-21 PROCEDURE — 99213 OFFICE O/P EST LOW 20 MIN: CPT | Performed by: OBSTETRICS & GYNECOLOGY

## 2024-08-21 PROCEDURE — 0501F PRENATAL FLOW SHEET: CPT | Performed by: OBSTETRICS & GYNECOLOGY

## 2024-08-21 PROCEDURE — 36415 COLL VENOUS BLD VENIPUNCTURE: CPT | Performed by: OBSTETRICS & GYNECOLOGY

## 2024-08-21 RX ORDER — LABETALOL HYDROCHLORIDE 5 MG/ML
20 INJECTION, SOLUTION INTRAVENOUS ONCE AS NEEDED
Status: DISCONTINUED | OUTPATIENT
Start: 2024-08-21 | End: 2024-08-21 | Stop reason: HOSPADM

## 2024-08-21 RX ORDER — ONDANSETRON HYDROCHLORIDE 2 MG/ML
4 INJECTION, SOLUTION INTRAVENOUS EVERY 6 HOURS PRN
Status: DISCONTINUED | OUTPATIENT
Start: 2024-08-21 | End: 2024-08-21 | Stop reason: HOSPADM

## 2024-08-21 RX ORDER — HYDRALAZINE HYDROCHLORIDE 20 MG/ML
5 INJECTION INTRAMUSCULAR; INTRAVENOUS ONCE AS NEEDED
Status: DISCONTINUED | OUTPATIENT
Start: 2024-08-21 | End: 2024-08-21 | Stop reason: HOSPADM

## 2024-08-21 RX ORDER — LIDOCAINE HYDROCHLORIDE 10 MG/ML
0.5 INJECTION, SOLUTION EPIDURAL; INFILTRATION; INTRACAUDAL; PERINEURAL ONCE AS NEEDED
Status: DISCONTINUED | OUTPATIENT
Start: 2024-08-21 | End: 2024-08-21 | Stop reason: HOSPADM

## 2024-08-21 RX ORDER — NIFEDIPINE 10 MG/1
10 CAPSULE ORAL ONCE AS NEEDED
Status: DISCONTINUED | OUTPATIENT
Start: 2024-08-21 | End: 2024-08-21 | Stop reason: HOSPADM

## 2024-08-21 RX ORDER — ACETAMINOPHEN 500 MG
1 TABLET ORAL 2 TIMES DAILY
Qty: 1 KIT | Refills: 0 | Status: SHIPPED | OUTPATIENT
Start: 2024-08-21

## 2024-08-21 RX ORDER — ONDANSETRON 4 MG/1
4 TABLET, FILM COATED ORAL EVERY 6 HOURS PRN
Status: DISCONTINUED | OUTPATIENT
Start: 2024-08-21 | End: 2024-08-21 | Stop reason: HOSPADM

## 2024-08-21 ASSESSMENT — PAIN SCALES - GENERAL: PAINLEVEL_OUTOF10: 0 - NO PAIN

## 2024-08-21 NOTE — H&P
Obstetrical Admission History and Physical     Carol Ortez is a 29 y.o.  at 36w1d. LUISA: 2024, by Last Menstrual Period. . She has had prenatal care with Brianne Rizzo .    Chief Complaint: Hypertension    Assessment/Plan    IUP at 36.1  Elevated BP in office- PEC labs pending.    Assessment & Plan        Pregnancy Problems (from 24 to present)       Problem Noted Resolved    Gestational hypertension, third trimester ([facility]) 2024 by Brianne Fatima MD No    Priority:  Medium      Overview Addendum 2024  4:04 PM by Brianne Fatima MD     Single elevated pressure at 36.1 weeks. Reviewed s/s of preeclampsia.   Will begin BP monitoring and obtain labs. Ultrasound today is reassuring.  36 week EFW 2797g, 45%.         Young primigravida in third trimester ([facility]) 3/13/2024 by Brianne Fatima MD No    Priority:  Medium      Overview Addendum 3/25/2024  3:51 PM by Brianne Fatima MD     Low risk pregnancy.   NIPS returned risk reducing. Normal 13 week early anatomy usn and NT.         36 weeks gestation of pregnancy ([facility]) 3/13/2024 by Brianne Fatima MD No    Priority:  Medium      Overview Addendum 2024  8:02 AM by Brianne Fatima MD     Glucola 122.  31 week EFW 1808g, 60%.          SGA (small for gestational age), fetal, affecting care of mother, antepartum ([facility]) 7/10/2024 by Brianne Fatima MD 2024 by Brianne Fatima MD    Priority:  Medium      Overview Addendum 2024 12:01 PM by Brianne Fatima MD     31 week EFW 1808g, 60%.                  Subjective   Dilfubessy is here complaining of high blood pressure.  Hypertension symptoms:  None    Good fetal movement. Denies vaginal bleeding., Denies contractions., Denies leaking of fluid.           Obstetrical History   OB History    Para Term  AB Living   1             SAB IAB Ectopic Multiple Live Births                  # Outcome Date GA Lbr Levi/2nd Weight Sex  Type Anes PTL Lv   1 Current                Past Medical History  Past Medical History:   Diagnosis Date    SGA (small for gestational age), fetal, affecting care of mother, antepartum ([facility]) 07/10/2024    31 week EFW 1808g, 60%.           Past Surgical History   No past surgical history on file.    Social History  Social History     Tobacco Use    Smoking status: Never    Smokeless tobacco: Never   Substance Use Topics    Alcohol use: Never     Substance and Sexual Activity   Drug Use Never       Allergies  Patient has no known allergies.     Medications  Medications Prior to Admission   Medication Sig Dispense Refill Last Dose    blood pressure monitor kit 1 Units 2 times a day. 1 kit 0     doxylamine-pyridoxine, vit B6, 10-10 mg tablet,delayed release (DR/EC) Take 2 tablets by mouth once daily at bedtime. You may add one tablet in the morning and one in the afternoon if nausea persists. 100 tablet 3     prenatal 78-iron-folate 1-dha (Prenate DHA, ferr asp glycin,) 18 mg iron-1 mg -300 mg capsule Take 1 capsule by mouth once daily. 90 capsule 3     triamcinolone (Kenalog) 0.025 % ointment Apply topically 2 times a day. 15 g 0        Objective    Last Vitals  Temp Pulse Resp BP MAP O2 Sat   36.6 °C (97.9 °F) 71 18 116/77 92 99 %     Physical Examination  GENERAL: Examination reveals a well developed, well nourished, gravid female in no acute distress. She is alert and cooperative.  LUNGS:  breathing unlabored  ABDOMEN: soft, gravid, nontender, nondistended, no abnormal masses, no epigastric pain  FHR is 140 , with accels  , and a cat 1  tracing.    [place] reading: q2-5- pt unaware   EXTREMITIES: no redness or tenderness in the calves or thighs, edema 1+  NEUROLOGICAL: alert, oriented, normal speech, no focal findings or movement disorder noted  PSYCHOLOGICAL: awake and alert; oriented to person, place, and time    Lab Review  Labs in chart were reviewed.

## 2024-08-21 NOTE — DISCHARGE INSTRUCTIONS
"Obstetrical Triage Note    Reason for Triage Observation: Hypertension    Assessment   hypertension  Triage Testing revealed normal BP's and labs   Patient's complaints have stayed the same.    Plan   Discharge home.     Subjective   History of Present Pregnancy  Carol Ortez is a 29 y.o.  gravid, female. Patient's last menstrual period was 2023. with an Estimated Date of Delivery of 2024, by Last Menstrual Period, who is now 36w1d gestation. The patient's blood type is O POS.       Objective   Recent Vital Signs:                                 /77   Pulse 71   Temp 36.6 °C (97.9 °F) (Temporal)   Resp 18   Ht 1.7 m (5' 6.93\")   Wt 84.4 kg (185 lb 15.3 oz)   BMI 29.19 kg/m²     BP & Temp Min/Max Last 24 Hours:     BP  Min: 113/77   Min taken time: 24 1601  Max: 138/110   Max taken time: 24 1416  Temp  Av.6 °C (97.9 °F)  Min: 36.6 °C (97.9 °F)   Min taken time: 24 1518  Max: 36.6 °C (97.9 °F)   Max taken time: 24 1518    Physical Examination:  GENERAL: Examination reveals a well developed, well nourished, gravid female in no acute distress. She is alert and cooperative.    Lab Review:   Lab Results   Component Value Date    WBC 10.3 2024    HGB 12.1 2024    HCT 36.5 2024     2024     No results found for: \"NA\", \"K\", \"CL\", \"CO2\", \"BUN\", \"CREATININE\", \"GLU\"  Lab Results   Component Value Date    UTPCR 0.19 (H) 2024     "

## 2024-08-21 NOTE — PROGRESS NOTES
"Subjective   Patient ID [id number]   Carol Ortez is a 29 y.o.   at 36w1d with a working estimated date of delivery of 2024, by Last Menstrual Period who presents for a routine prenatal visit. She denies vaginal bleeding, leakage of fluid, decreased fetal movements, or contractions.  S/S of preeclampsia were reviewed. She notes some mild edema of feet and denies other symptoms. USN report is pending from prior to visit but EFW is AGA. She agrees to begin monitoring BP and to obtain baseline labs.     Objective   Physical Exam  Weight: 84.6 kg (186 lb 8 oz)  Expected Total Weight Gain: 11.5 kg (25 lb)-16 kg (35 lb)   Pregravid BMI: 20.55  BP: (!) 138/110 and 138/100 prior.  Fetal Heart Rate: 140 Fundal Height (cm): 36 cm    Prenatal Labs  Urine dip:  No results found for: \"KETONESU\", \"GLUCOSEUR\", \"LEUKOCYTESUR\"    Lab Results   Component Value Date    HGB 12.3 2024    HCT 36.3 2024    ABO O 2024    HEPBSAG Nonreactive 2024         Problem List Items Addressed This Visit       Young primigravida in third trimester ([facility]) - Primary    Overview     Low risk pregnancy.   NIPS returned risk reducing. Normal 13 week early anatomy usn and NT.         Gestational hypertension, third trimester ([facility])    Overview     Single elevated pressure at 36.1 weeks. Reviewed s/s of preeclampsia.   Will begin BP monitoring and obtain labs. Ultrasound today is reassuring.         Relevant Medications    blood pressure monitor kit    36 weeks gestation of pregnancy ([facility])    Overview     Glucola 122.  31 week EFW 1808g, 60%.          Relevant Medications    blood pressure monitor kit        Continue prenatal vitamin.  Labs reviewed.  GBBS is obtained.  Will send to labor and delivery for preeclampsia evaluation. BP monitor is prescribed and BP log is given.   Follow up in one week if discharged home from labor and delivery.  "

## 2024-08-25 LAB — GP B STREP GENITAL QL CULT: NORMAL

## 2024-08-28 ENCOUNTER — APPOINTMENT (OUTPATIENT)
Dept: OBSTETRICS AND GYNECOLOGY | Facility: CLINIC | Age: 29
End: 2024-08-28
Payer: COMMERCIAL

## 2024-08-28 ENCOUNTER — LAB (OUTPATIENT)
Dept: LAB | Facility: LAB | Age: 29
End: 2024-08-28
Payer: COMMERCIAL

## 2024-08-28 VITALS — WEIGHT: 191 LBS | BODY MASS INDEX: 29.98 KG/M2 | SYSTOLIC BLOOD PRESSURE: 120 MMHG | DIASTOLIC BLOOD PRESSURE: 90 MMHG

## 2024-08-28 DIAGNOSIS — O13.3 GESTATIONAL HYPERTENSION, THIRD TRIMESTER (HHS-HCC): ICD-10-CM

## 2024-08-28 DIAGNOSIS — Z3A.37 37 WEEKS GESTATION OF PREGNANCY (HHS-HCC): ICD-10-CM

## 2024-08-28 DIAGNOSIS — O13.3 GESTATIONAL HYPERTENSION, THIRD TRIMESTER (HHS-HCC): Primary | ICD-10-CM

## 2024-08-28 LAB
ALBUMIN SERPL BCP-MCNC: 3.2 G/DL (ref 3.4–5)
ALP SERPL-CCNC: 146 U/L (ref 33–110)
ALT SERPL W P-5'-P-CCNC: 13 U/L (ref 7–45)
ANION GAP SERPL CALC-SCNC: 12 MMOL/L (ref 10–20)
AST SERPL W P-5'-P-CCNC: 17 U/L (ref 9–39)
BILIRUB SERPL-MCNC: 0.3 MG/DL (ref 0–1.2)
BUN SERPL-MCNC: 8 MG/DL (ref 6–23)
CALCIUM SERPL-MCNC: 8.4 MG/DL (ref 8.6–10.3)
CHLORIDE SERPL-SCNC: 106 MMOL/L (ref 98–107)
CO2 SERPL-SCNC: 23 MMOL/L (ref 21–32)
CREAT SERPL-MCNC: 0.71 MG/DL (ref 0.5–1.05)
EGFRCR SERPLBLD CKD-EPI 2021: >90 ML/MIN/1.73M*2
ERYTHROCYTE [DISTWIDTH] IN BLOOD BY AUTOMATED COUNT: 13 % (ref 11.5–14.5)
GLUCOSE SERPL-MCNC: 96 MG/DL (ref 74–99)
HCT VFR BLD AUTO: 36.1 % (ref 36–46)
HGB BLD-MCNC: 11.6 G/DL (ref 12–16)
MCH RBC QN AUTO: 30.1 PG (ref 26–34)
MCHC RBC AUTO-ENTMCNC: 32.1 G/DL (ref 32–36)
MCV RBC AUTO: 94 FL (ref 80–100)
NRBC BLD-RTO: 0 /100 WBCS (ref 0–0)
PLATELET # BLD AUTO: 200 X10*3/UL (ref 150–450)
POTASSIUM SERPL-SCNC: 4.8 MMOL/L (ref 3.5–5.3)
PROT SERPL-MCNC: 5.4 G/DL (ref 6.4–8.2)
RBC # BLD AUTO: 3.86 X10*6/UL (ref 4–5.2)
SODIUM SERPL-SCNC: 136 MMOL/L (ref 136–145)
WBC # BLD AUTO: 8.3 X10*3/UL (ref 4.4–11.3)

## 2024-08-28 PROCEDURE — 0501F PRENATAL FLOW SHEET: CPT | Performed by: OBSTETRICS & GYNECOLOGY

## 2024-08-28 PROCEDURE — 80053 COMPREHEN METABOLIC PANEL: CPT

## 2024-08-28 PROCEDURE — 85027 COMPLETE CBC AUTOMATED: CPT

## 2024-08-28 PROCEDURE — 59025 FETAL NON-STRESS TEST: CPT | Performed by: OBSTETRICS & GYNECOLOGY

## 2024-08-28 PROCEDURE — 36415 COLL VENOUS BLD VENIPUNCTURE: CPT

## 2024-08-28 NOTE — PROGRESS NOTES
"Subjective   Patient ID [id number]   Carol Ortez is a 29 y.o.   at 37w1d with a working estimated date of delivery of 2024, by Last Menstrual Period who presents for a routine prenatal visit. She denies vaginal bleeding, leakage of fluid, decreased fetal movements, or contractions.  She was evaluated at labor and delivery last week for elevated BP with normal pressures and labs. BP log is reviewed today with normal pressures. She does admit to increased edema of legs over the past few days. She denies headache, vision change, abdominal pain or nausea.  We reviewed that diagnosis of gestational hypertension is now confirmed with second documented elevated BP. We reviewed recommendation for delivery now given that she is 37.1 weeks pregnant. She is not willing to proceed with induction at this time and desires to continue close monitoring of BP and s/s of preeclampsia.     Objective   Physical Exam  Weight: 86.6 kg (191 lb)  Expected Total Weight Gain: 11.5 kg (25 lb)-16 kg (35 lb)   Pregravid BMI: 20.07  BP: 120/90  Fetal Heart Rate: 140 Fundal Height (cm): 37 cm  NST is reactive.    Prenatal Labs  Urine dip:  No results found for: \"KETONESU\", \"GLUCOSEUR\", \"LEUKOCYTESUR\"    Lab Results   Component Value Date    HGB 12.1 2024    HCT 36.5 2024    ABO O 2024    HEPBSAG Nonreactive 2024         Problem List Items Addressed This Visit       Gestational hypertension, third trimester ([facility]) - Primary    Overview     Single elevated pressure at 36.1 weeks with second at 37.1. Reviewed s/s of preeclampsia.   Home BP log is in target range.  36 week EFW 2797g, 45%.         Relevant Orders    Fetal nonstress test, once    37 weeks gestation of pregnancy ([facility])    Overview     Glucola 122.  31 week EFW 1808g, 60%.          Relevant Orders    Fetal nonstress test, once        Continue prenatal vitamin.  Labs reviewed.  Induction is recommended but declined by patient. We will " continue with weekly labs and twice weekly AP testing. She agrees to consider recommendation for induction and to call if she will allow us to schedule induction.  Follow up as scheduled for a routine prenatal visit.

## 2024-08-28 NOTE — PROCEDURES
Carol Ortez, a  at 37w1d with an LUISA of 2024, by Last Menstrual Period, was seen at [facility] [place] for a nonstress test.    Non-Stress Test   Baseline Fetal Heart Rate for Non-Stress Test: 140 BPM  Variability in Waveform for Non-Stress Test: Moderate  Accelerations in Non-Stress Test: greater than/equal to 15 bpm, lasting at least 15 seconds, Yes  Decelerations in Non-Stress Test: None  Contractions in Non-Stress Test: Irregular  Acoustic Stimulator for Non-Stress Test: No  Interpretation of Non-Stress Test   Interpretation of Non-Stress Test: Reactive

## 2024-08-29 LAB — REFLEX ADDED, ANEMIA PANEL: NORMAL

## 2024-09-03 ENCOUNTER — APPOINTMENT (OUTPATIENT)
Dept: OBSTETRICS AND GYNECOLOGY | Facility: CLINIC | Age: 29
End: 2024-09-03
Payer: COMMERCIAL

## 2024-09-03 ENCOUNTER — HOSPITAL ENCOUNTER (OUTPATIENT)
Dept: RADIOLOGY | Facility: CLINIC | Age: 29
Discharge: HOME | End: 2024-09-03
Payer: COMMERCIAL

## 2024-09-03 ENCOUNTER — ANESTHESIA EVENT (OUTPATIENT)
Dept: OBSTETRICS AND GYNECOLOGY | Facility: HOSPITAL | Age: 29
End: 2024-09-03
Payer: COMMERCIAL

## 2024-09-03 ENCOUNTER — ANESTHESIA (OUTPATIENT)
Dept: OBSTETRICS AND GYNECOLOGY | Facility: HOSPITAL | Age: 29
End: 2024-09-03
Payer: COMMERCIAL

## 2024-09-03 ENCOUNTER — HOSPITAL ENCOUNTER (INPATIENT)
Facility: HOSPITAL | Age: 29
LOS: 3 days | Discharge: HOME | End: 2024-09-06
Attending: OBSTETRICS & GYNECOLOGY | Admitting: ADVANCED PRACTICE MIDWIFE
Payer: COMMERCIAL

## 2024-09-03 VITALS — BODY MASS INDEX: 29.98 KG/M2 | DIASTOLIC BLOOD PRESSURE: 100 MMHG | SYSTOLIC BLOOD PRESSURE: 130 MMHG | WEIGHT: 191 LBS

## 2024-09-03 DIAGNOSIS — O13.3 GESTATIONAL HYPERTENSION, THIRD TRIMESTER (HHS-HCC): ICD-10-CM

## 2024-09-03 DIAGNOSIS — O36.80X0 PREGNANCY WITH INCONCLUSIVE FETAL VIABILITY (HHS-HCC): ICD-10-CM

## 2024-09-03 DIAGNOSIS — Z3A.38 38 WEEKS GESTATION OF PREGNANCY (HHS-HCC): ICD-10-CM

## 2024-09-03 DIAGNOSIS — O13.3 GESTATIONAL HYPERTENSION, THIRD TRIMESTER (HHS-HCC): Primary | ICD-10-CM

## 2024-09-03 LAB
ABO GROUP (TYPE) IN BLOOD: NORMAL
ALBUMIN SERPL BCP-MCNC: 3.3 G/DL (ref 3.4–5)
ALP SERPL-CCNC: 174 U/L (ref 33–110)
ALT SERPL W P-5'-P-CCNC: 15 U/L (ref 7–45)
ANION GAP SERPL CALC-SCNC: 13 MMOL/L (ref 10–20)
ANTIBODY SCREEN: NORMAL
AST SERPL W P-5'-P-CCNC: 21 U/L (ref 9–39)
BILIRUB SERPL-MCNC: 0.3 MG/DL (ref 0–1.2)
BUN SERPL-MCNC: 9 MG/DL (ref 6–23)
CALCIUM SERPL-MCNC: 8.9 MG/DL (ref 8.6–10.3)
CHLORIDE SERPL-SCNC: 106 MMOL/L (ref 98–107)
CO2 SERPL-SCNC: 21 MMOL/L (ref 21–32)
CREAT SERPL-MCNC: 0.57 MG/DL (ref 0.5–1.05)
CREAT UR-MCNC: 220.4 MG/DL (ref 20–320)
EGFRCR SERPLBLD CKD-EPI 2021: >90 ML/MIN/1.73M*2
ERYTHROCYTE [DISTWIDTH] IN BLOOD BY AUTOMATED COUNT: 13.2 % (ref 11.5–14.5)
GLUCOSE SERPL-MCNC: 64 MG/DL (ref 74–99)
HCT VFR BLD AUTO: 36.7 % (ref 36–46)
HGB BLD-MCNC: 12.1 G/DL (ref 12–16)
MCH RBC QN AUTO: 30.2 PG (ref 26–34)
MCHC RBC AUTO-ENTMCNC: 33 G/DL (ref 32–36)
MCV RBC AUTO: 92 FL (ref 80–100)
NRBC BLD-RTO: 0 /100 WBCS (ref 0–0)
PLATELET # BLD AUTO: 185 X10*3/UL (ref 150–450)
POTASSIUM SERPL-SCNC: 4.1 MMOL/L (ref 3.5–5.3)
PROT SERPL-MCNC: 5.6 G/DL (ref 6.4–8.2)
PROT UR-ACNC: 35 MG/DL (ref 5–24)
PROT/CREAT UR: 0.16 MG/MG CREAT (ref 0–0.17)
RBC # BLD AUTO: 4.01 X10*6/UL (ref 4–5.2)
RH FACTOR (ANTIGEN D): NORMAL
SODIUM SERPL-SCNC: 136 MMOL/L (ref 136–145)
TREPONEMA PALLIDUM IGG+IGM AB [PRESENCE] IN SERUM OR PLASMA BY IMMUNOASSAY: NONREACTIVE
WBC # BLD AUTO: 10.3 X10*3/UL (ref 4.4–11.3)

## 2024-09-03 PROCEDURE — 86901 BLOOD TYPING SEROLOGIC RH(D): CPT | Performed by: ADVANCED PRACTICE MIDWIFE

## 2024-09-03 PROCEDURE — 85027 COMPLETE CBC AUTOMATED: CPT | Performed by: ADVANCED PRACTICE MIDWIFE

## 2024-09-03 PROCEDURE — 76815 OB US LIMITED FETUS(S): CPT | Performed by: MEDICAL GENETICS

## 2024-09-03 PROCEDURE — 82570 ASSAY OF URINE CREATININE: CPT | Performed by: OBSTETRICS & GYNECOLOGY

## 2024-09-03 PROCEDURE — 76819 FETAL BIOPHYS PROFIL W/O NST: CPT

## 2024-09-03 PROCEDURE — 36415 COLL VENOUS BLD VENIPUNCTURE: CPT | Performed by: OBSTETRICS & GYNECOLOGY

## 2024-09-03 PROCEDURE — 86850 RBC ANTIBODY SCREEN: CPT | Performed by: ADVANCED PRACTICE MIDWIFE

## 2024-09-03 PROCEDURE — 86780 TREPONEMA PALLIDUM: CPT | Mod: AHULAB | Performed by: ADVANCED PRACTICE MIDWIFE

## 2024-09-03 PROCEDURE — 0501F PRENATAL FLOW SHEET: CPT | Performed by: OBSTETRICS & GYNECOLOGY

## 2024-09-03 PROCEDURE — 1220000001 HC OB SEMI-PRIVATE ROOM DAILY

## 2024-09-03 PROCEDURE — 76819 FETAL BIOPHYS PROFIL W/O NST: CPT | Performed by: MEDICAL GENETICS

## 2024-09-03 PROCEDURE — 76815 OB US LIMITED FETUS(S): CPT

## 2024-09-03 PROCEDURE — 80053 COMPREHEN METABOLIC PANEL: CPT | Performed by: OBSTETRICS & GYNECOLOGY

## 2024-09-03 PROCEDURE — 36415 COLL VENOUS BLD VENIPUNCTURE: CPT | Performed by: ADVANCED PRACTICE MIDWIFE

## 2024-09-03 PROCEDURE — 2500000004 HC RX 250 GENERAL PHARMACY W/ HCPCS (ALT 636 FOR OP/ED): Performed by: OBSTETRICS & GYNECOLOGY

## 2024-09-03 RX ORDER — LABETALOL HYDROCHLORIDE 5 MG/ML
20 INJECTION, SOLUTION INTRAVENOUS ONCE AS NEEDED
Status: DISCONTINUED | OUTPATIENT
Start: 2024-09-03 | End: 2024-09-05

## 2024-09-03 RX ORDER — TRANEXAMIC ACID 100 MG/ML
1000 INJECTION, SOLUTION INTRAVENOUS ONCE AS NEEDED
Status: DISCONTINUED | OUTPATIENT
Start: 2024-09-03 | End: 2024-09-05

## 2024-09-03 RX ORDER — METHYLERGONOVINE MALEATE 0.2 MG/ML
0.2 INJECTION INTRAVENOUS ONCE AS NEEDED
Status: DISCONTINUED | OUTPATIENT
Start: 2024-09-03 | End: 2024-09-05

## 2024-09-03 RX ORDER — OXYTOCIN 10 [USP'U]/ML
10 INJECTION, SOLUTION INTRAMUSCULAR; INTRAVENOUS ONCE AS NEEDED
Status: DISCONTINUED | OUTPATIENT
Start: 2024-09-03 | End: 2024-09-05

## 2024-09-03 RX ORDER — OXYTOCIN/0.9 % SODIUM CHLORIDE 30/500 ML
2-30 PLASTIC BAG, INJECTION (ML) INTRAVENOUS CONTINUOUS
Status: DISCONTINUED | OUTPATIENT
Start: 2024-09-03 | End: 2024-09-05

## 2024-09-03 RX ORDER — METOCLOPRAMIDE HYDROCHLORIDE 5 MG/ML
10 INJECTION INTRAMUSCULAR; INTRAVENOUS EVERY 6 HOURS PRN
Status: DISCONTINUED | OUTPATIENT
Start: 2024-09-03 | End: 2024-09-05

## 2024-09-03 RX ORDER — CARBOPROST TROMETHAMINE 250 UG/ML
250 INJECTION, SOLUTION INTRAMUSCULAR ONCE AS NEEDED
Status: DISCONTINUED | OUTPATIENT
Start: 2024-09-03 | End: 2024-09-05

## 2024-09-03 RX ORDER — LOPERAMIDE HYDROCHLORIDE 2 MG/1
4 CAPSULE ORAL EVERY 2 HOUR PRN
Status: DISCONTINUED | OUTPATIENT
Start: 2024-09-03 | End: 2024-09-05

## 2024-09-03 RX ORDER — SODIUM CHLORIDE, SODIUM LACTATE, POTASSIUM CHLORIDE, CALCIUM CHLORIDE 600; 310; 30; 20 MG/100ML; MG/100ML; MG/100ML; MG/100ML
125 INJECTION, SOLUTION INTRAVENOUS CONTINUOUS
Status: DISCONTINUED | OUTPATIENT
Start: 2024-09-03 | End: 2024-09-05

## 2024-09-03 RX ORDER — ONDANSETRON 4 MG/1
4 TABLET, FILM COATED ORAL EVERY 6 HOURS PRN
Status: DISCONTINUED | OUTPATIENT
Start: 2024-09-03 | End: 2024-09-05

## 2024-09-03 RX ORDER — TERBUTALINE SULFATE 1 MG/ML
0.25 INJECTION SUBCUTANEOUS ONCE AS NEEDED
Status: DISCONTINUED | OUTPATIENT
Start: 2024-09-03 | End: 2024-09-05

## 2024-09-03 RX ORDER — OXYTOCIN/0.9 % SODIUM CHLORIDE 30/500 ML
60 PLASTIC BAG, INJECTION (ML) INTRAVENOUS ONCE AS NEEDED
Status: COMPLETED | OUTPATIENT
Start: 2024-09-03 | End: 2024-09-05

## 2024-09-03 RX ORDER — HYDRALAZINE HYDROCHLORIDE 20 MG/ML
5 INJECTION INTRAMUSCULAR; INTRAVENOUS ONCE AS NEEDED
Status: DISCONTINUED | OUTPATIENT
Start: 2024-09-03 | End: 2024-09-05

## 2024-09-03 RX ORDER — LIDOCAINE HYDROCHLORIDE 10 MG/ML
30 INJECTION INFILTRATION; PERINEURAL ONCE AS NEEDED
Status: DISCONTINUED | OUTPATIENT
Start: 2024-09-03 | End: 2024-09-05

## 2024-09-03 RX ORDER — NIFEDIPINE 10 MG/1
10 CAPSULE ORAL ONCE AS NEEDED
Status: DISCONTINUED | OUTPATIENT
Start: 2024-09-03 | End: 2024-09-05

## 2024-09-03 RX ORDER — ONDANSETRON HYDROCHLORIDE 2 MG/ML
4 INJECTION, SOLUTION INTRAVENOUS EVERY 6 HOURS PRN
Status: DISCONTINUED | OUTPATIENT
Start: 2024-09-03 | End: 2024-09-05

## 2024-09-03 RX ORDER — METOCLOPRAMIDE 10 MG/1
10 TABLET ORAL EVERY 6 HOURS PRN
Status: DISCONTINUED | OUTPATIENT
Start: 2024-09-03 | End: 2024-09-05

## 2024-09-03 RX ORDER — MISOPROSTOL 200 UG/1
800 TABLET ORAL ONCE AS NEEDED
Status: DISCONTINUED | OUTPATIENT
Start: 2024-09-03 | End: 2024-09-05

## 2024-09-03 SDOH — SOCIAL STABILITY: SOCIAL INSECURITY: ARE THERE ANY APPARENT SIGNS OF INJURIES/BEHAVIORS THAT COULD BE RELATED TO ABUSE/NEGLECT?: NO

## 2024-09-03 SDOH — SOCIAL STABILITY: SOCIAL INSECURITY: ARE YOU OR HAVE YOU BEEN THREATENED OR ABUSED PHYSICALLY, EMOTIONALLY, OR SEXUALLY BY ANYONE?: NO

## 2024-09-03 SDOH — SOCIAL STABILITY: SOCIAL INSECURITY: VERBAL ABUSE: DENIES

## 2024-09-03 SDOH — SOCIAL STABILITY: SOCIAL INSECURITY: PHYSICAL ABUSE: DENIES

## 2024-09-03 SDOH — ECONOMIC STABILITY: HOUSING INSECURITY: DO YOU FEEL UNSAFE GOING BACK TO THE PLACE WHERE YOU ARE LIVING?: NO

## 2024-09-03 SDOH — HEALTH STABILITY: MENTAL HEALTH: NON-SPECIFIC ACTIVE SUICIDAL THOUGHTS (PAST 1 MONTH): NO

## 2024-09-03 SDOH — HEALTH STABILITY: MENTAL HEALTH: WERE YOU ABLE TO COMPLETE ALL THE BEHAVIORAL HEALTH SCREENINGS?: YES

## 2024-09-03 SDOH — SOCIAL STABILITY: SOCIAL INSECURITY: HAVE YOU HAD THOUGHTS OF HARMING ANYONE ELSE?: NO

## 2024-09-03 SDOH — HEALTH STABILITY: MENTAL HEALTH: SUICIDAL BEHAVIOR (LIFETIME): NO

## 2024-09-03 SDOH — HEALTH STABILITY: MENTAL HEALTH: WISH TO BE DEAD (PAST 1 MONTH): NO

## 2024-09-03 SDOH — SOCIAL STABILITY: SOCIAL INSECURITY: ABUSE SCREEN: ADULT

## 2024-09-03 SDOH — HEALTH STABILITY: MENTAL HEALTH: CURRENT SMOKER: 0

## 2024-09-03 SDOH — SOCIAL STABILITY: SOCIAL INSECURITY: HAVE YOU HAD ANY THOUGHTS OF HARMING ANYONE ELSE?: NO

## 2024-09-03 SDOH — SOCIAL STABILITY: SOCIAL INSECURITY: HAS ANYONE EVER THREATENED TO HURT YOUR FAMILY OR YOUR PETS?: NO

## 2024-09-03 SDOH — SOCIAL STABILITY: SOCIAL INSECURITY: DOES ANYONE TRY TO KEEP YOU FROM HAVING/CONTACTING OTHER FRIENDS OR DOING THINGS OUTSIDE YOUR HOME?: NO

## 2024-09-03 SDOH — SOCIAL STABILITY: SOCIAL INSECURITY: DO YOU FEEL ANYONE HAS EXPLOITED OR TAKEN ADVANTAGE OF YOU FINANCIALLY OR OF YOUR PERSONAL PROPERTY?: NO

## 2024-09-03 ASSESSMENT — LIFESTYLE VARIABLES
HOW MANY STANDARD DRINKS CONTAINING ALCOHOL DO YOU HAVE ON A TYPICAL DAY: PATIENT DOES NOT DRINK
HOW OFTEN DO YOU HAVE A DRINK CONTAINING ALCOHOL: NEVER
AUDIT-C TOTAL SCORE: 0
HOW OFTEN DO YOU HAVE 6 OR MORE DRINKS ON ONE OCCASION: NEVER
AUDIT-C TOTAL SCORE: 0
SKIP TO QUESTIONS 9-10: 1

## 2024-09-03 ASSESSMENT — ACTIVITIES OF DAILY LIVING (ADL): LACK_OF_TRANSPORTATION: NO

## 2024-09-03 ASSESSMENT — PATIENT HEALTH QUESTIONNAIRE - PHQ9
2. FEELING DOWN, DEPRESSED OR HOPELESS: NOT AT ALL
1. LITTLE INTEREST OR PLEASURE IN DOING THINGS: NOT AT ALL
SUM OF ALL RESPONSES TO PHQ9 QUESTIONS 1 & 2: 0

## 2024-09-03 ASSESSMENT — PAIN SCALES - GENERAL
PAINLEVEL_OUTOF10: 0 - NO PAIN
PAINLEVEL_OUTOF10: 4

## 2024-09-03 NOTE — ANESTHESIA PREPROCEDURE EVALUATION
Patient: Carol Ortez    Evaluation Method: In-person visit    Procedure Information    Date: 09/03/24  Procedure: Labor Consult         Relevant Problems   Anesthesia (within normal limits)      Cardiac   (+) Gestational hypertension w/o significant proteinuria in 3rd trimester ([facility])      Pulmonary (within normal limits)      Neuro (within normal limits)      GI (within normal limits)      /Renal (within normal limits)      Liver (within normal limits)      Endocrine (within normal limits)      Hematology (within normal limits)      Musculoskeletal (within normal limits)      HEENT (within normal limits)      ID (within normal limits)      Skin (within normal limits)      GYN   (+) 38 weeks gestation of pregnancy ([facility])       Clinical information reviewed:   Tobacco  Allergies  Meds   Med Hx  Surg Hx   Fam Hx  Soc Hx        NPO Detail:  No data recorded     OB/Gyn Evaluation    Present Pregnancy    Patient is pregnant now.   Obstetric History                Physical Exam    Airway  Mallampati: II  TM distance: >3 FB  Neck ROM: full     Cardiovascular - normal exam     Dental - normal exam     Pulmonary - normal exam     Abdominal - normal exam             Anesthesia Plan    History of general anesthesia?: no  History of complications of general anesthesia?: unknown/emergency    ASA 2     epidural     The patient is not a current smoker.    Anesthetic plan and risks discussed with patient.  Use of blood products discussed with patient who consented to blood products.    Plan discussed with CRNA.

## 2024-09-03 NOTE — PROGRESS NOTES
"Subjective   Patient ID [id number]   Carol Ortez is a 29 y.o.   at 38w0d with a working estimated date of delivery of 2024, by Last Menstrual Period who presents for a routine prenatal visit. She denies vaginal bleeding, leakage of fluid, decreased fetal movements, or contractions.  Home BP is in normal range. She again will not initially agree to induction today, but = after further discussion she is willing to present to labor and delivery for induction. She denies headache, abdominal pain, vision change, nausea, but she does have edema of feet. Baby is active. BPP report is pending.     Objective   Physical Exam  Weight: 86.6 kg (191 lb)  Expected Total Weight Gain: 11.5 kg (25 lb)-16 kg (35 lb)   Pregravid BMI: 20.07  BP: (!) 130/100  Fetal Heart Rate: 160 Fundal Height (cm): 37 cm  Ft/70/-2    Prenatal Labs  Urine dip:  No results found for: \"KETONESU\", \"GLUCOSEUR\", \"LEUKOCYTESUR\"    Lab Results   Component Value Date    HGB 11.6 (L) 2024    HCT 36.1 2024    ABO O 2024    HEPBSAG Nonreactive 2024         Problem List Items Addressed This Visit       Gestational hypertension, third trimester ([facility]) - Primary    Overview     Single elevated pressure at 36.1 weeks with second at 37.1. Reviewed s/s of preeclampsia.   Home BP log is in target range.  36 week EFW 2797g, 45%.         38 weeks gestation of pregnancy ([facility])    Overview     Glucola 122.  31 week EFW 1808g, 60%.              Continue prenatal vitamin.  Labs reviewed.  We discussed interiano bulb induction with possible cytotec or pitocin.    "

## 2024-09-03 NOTE — H&P
Note Type:  OB Admission H&P    ASSESSMENT & PLAN: Carol Ortez is a 29 y.o.  at 38w0d. LUISA: 2024, by Last Menstrual Period.     Diagnosis: Gestational Hypertension  -Diagnosed based on: elevated blood pressures  -Blood pressure goal <160/110  -Short acting medications received? No   -Long acting antihypertensive: None  -Preeclampsia labs: Pending    Plan  -Admit to L&D, consented  -CRB inserted without difficulty, patient tolerated procedure well  -GBS prophylaxis: not indicated    Fetal Status  -NST reactive, reassuring   -Presentation Cephalic based on bedside ultrasound  -EFW 6lbs 14oz by [place]  -1hr Normal  -GBS Negative      Pregnancy Problems (from 24 to present)       Problem Noted Resolved    Gestational hypertension w/o significant proteinuria in 3rd trimester ([facility]) 9/3/2024 by BK Persaud-KOKO No    Priority:  Medium      Gestational hypertension, third trimester ([facility]) 2024 by Brianne Fatima MD No    Priority:  Medium      Overview Addendum 2024  8:25 AM by Brianne Fatima MD     Single elevated pressure at 36.1 weeks with second at 37.1. Reviewed s/s of preeclampsia.   Home BP log is in target range.  36 week EFW 2797g, 45%.         Young primigravida in third trimester ([facility]) 3/13/2024 by Brianne Fatima MD No    Priority:  Medium      Overview Addendum 3/25/2024  3:51 PM by Brianne Fatima MD     Low risk pregnancy.   NIPS returned risk reducing. Normal 13 week early anatomy usn and NT.         38 weeks gestation of pregnancy ([facility]) 3/13/2024 by Brianne Fatima MD No    Priority:  Medium      Overview Addendum 2024  8:02 AM by Brianne Fatima MD     Glucola 122.  31 week EFW 1808g, 60%.          SGA (small for gestational age), fetal, affecting care of mother, antepartum ([facility]) 7/10/2024 by Brianne Fatima MD 2024 by Brianne Fatima MD    Overview Addendum 2024 12:01 PM by Brianne BETTENCOURT  MD Kushal     31 week EFW 1808g, 60%.                  Subjective   This is a 28yo G1 at 38w presents for induction of  labor due to gestational hypertension.  She denies HA, vision changes, Ruq pain.  This pregnancy has also been notable for:  ASCUS +HPV pap      Prenatal Provider Dr. Fatima    OB History    Para Term  AB Living   1 0 0 0 0 0   SAB IAB Ectopic Multiple Live Births   0 0 0 0 0      # Outcome Date GA Lbr Levi/2nd Weight Sex Type Anes PTL Lv   1 Current                History reviewed. No pertinent surgical history.    Social History     Tobacco Use    Smoking status: Never    Smokeless tobacco: Never   Substance Use Topics    Alcohol use: Never       No Known Allergies    Medications Prior to Admission   Medication Sig Dispense Refill Last Dose    blood pressure monitor kit 1 Units 2 times a day. 1 kit 0     doxylamine-pyridoxine, vit B6, 10-10 mg tablet,delayed release (DR/EC) Take 2 tablets by mouth once daily at bedtime. You may add one tablet in the morning and one in the afternoon if nausea persists. 100 tablet 3     prenatal 78-iron-folate 1-dha (Prenate DHA, ferr asp glycin,) 18 mg iron-1 mg -300 mg capsule Take 1 capsule by mouth once daily. 90 capsule 3     triamcinolone (Kenalog) 0.025 % ointment Apply topically 2 times a day. 15 g 0      Objective     Last Vitals  Temp Pulse Resp BP MAP O2 Sat   36.7 °C (98.1 °F) 76 18 131/81 99 99 %     Blood Pressures         9/3/2024  1349 9/3/2024  1527          BP: 123/78 131/81               Physical Exam  General: NAD, mood appropriate  Cardiopulmonary: warm and well perfused, breathing comfortably on room air  Abdomen: Gravid, non-tender  Extremities: full range of motion  Cervix: 1 /0 /-2      Fetal Monitoring  Baseline: 140s bpm, Variability: moderate,  Accelerations: present and Decelerations: none    Uterine Activity: Contractions present    Interpretation: Reactive    Bedside ultrasound: Yes      Results from last 7 days   Lab  Units 09/03/24  1559 08/28/24  0925   WBC AUTO x10*3/uL 10.3 8.3   HEMOGLOBIN g/dL 12.1 11.6*   HEMATOCRIT % 36.7 36.1   PLATELETS AUTO x10*3/uL 185 200   AST U/L  --  17   ALT U/L  --  13   CREATININE mg/dL  --  0.71        Prenatal labs reviewed, not remarkable.

## 2024-09-04 ENCOUNTER — ANESTHESIA (OUTPATIENT)
Dept: OBSTETRICS AND GYNECOLOGY | Facility: HOSPITAL | Age: 29
End: 2024-09-04
Payer: COMMERCIAL

## 2024-09-04 ENCOUNTER — APPOINTMENT (OUTPATIENT)
Dept: OBSTETRICS AND GYNECOLOGY | Facility: CLINIC | Age: 29
End: 2024-09-04
Payer: COMMERCIAL

## 2024-09-04 ENCOUNTER — ANESTHESIA EVENT (OUTPATIENT)
Dept: OBSTETRICS AND GYNECOLOGY | Facility: HOSPITAL | Age: 29
End: 2024-09-04
Payer: COMMERCIAL

## 2024-09-04 PROCEDURE — 59410 OBSTETRICAL CARE: CPT | Performed by: OBSTETRICS & GYNECOLOGY

## 2024-09-04 PROCEDURE — 2500000004 HC RX 250 GENERAL PHARMACY W/ HCPCS (ALT 636 FOR OP/ED): Performed by: ADVANCED PRACTICE MIDWIFE

## 2024-09-04 PROCEDURE — 2500000004 HC RX 250 GENERAL PHARMACY W/ HCPCS (ALT 636 FOR OP/ED): Performed by: NURSE ANESTHETIST, CERTIFIED REGISTERED

## 2024-09-04 PROCEDURE — 1220000001 HC OB SEMI-PRIVATE ROOM DAILY

## 2024-09-04 PROCEDURE — 51701 INSERT BLADDER CATHETER: CPT

## 2024-09-04 PROCEDURE — 59409 OBSTETRICAL CARE: CPT | Performed by: OBSTETRICS & GYNECOLOGY

## 2024-09-04 PROCEDURE — 3E033VJ INTRODUCTION OF OTHER HORMONE INTO PERIPHERAL VEIN, PERCUTANEOUS APPROACH: ICD-10-PCS | Performed by: OBSTETRICS & GYNECOLOGY

## 2024-09-04 PROCEDURE — 3700000014 EPIDURAL BLOCK: Performed by: NURSE ANESTHETIST, CERTIFIED REGISTERED

## 2024-09-04 PROCEDURE — 2500000004 HC RX 250 GENERAL PHARMACY W/ HCPCS (ALT 636 FOR OP/ED): Performed by: OBSTETRICS & GYNECOLOGY

## 2024-09-04 RX ORDER — ACETAMINOPHEN 325 MG/1
650 TABLET ORAL ONCE
Status: COMPLETED | OUTPATIENT
Start: 2024-09-04 | End: 2024-09-04

## 2024-09-04 RX ORDER — FENTANYL/ROPIVACAINE/NS/PF 2MCG/ML-.2
0-25 PLASTIC BAG, INJECTION (ML) INJECTION CONTINUOUS
Status: DISCONTINUED | OUTPATIENT
Start: 2024-09-04 | End: 2024-09-05

## 2024-09-04 SDOH — HEALTH STABILITY: MENTAL HEALTH: CURRENT SMOKER: 0

## 2024-09-04 ASSESSMENT — PAIN SCALES - GENERAL
PAINLEVEL_OUTOF10: 0 - NO PAIN
PAINLEVEL_OUTOF10: 5 - MODERATE PAIN

## 2024-09-04 NOTE — PROGRESS NOTES
Cervix remains unchanged  Patient declining AROM at this time  Cat I tracing  Pitocin at 12 mu/min  Patient to discuss with her partner whether she is amenable to AROM and epidural, and let us know

## 2024-09-04 NOTE — PROGRESS NOTES
Intrapartum Progress Note    Assessment/Plan   Carol Ortez is a 29 y.o.  at 38w1d. LUISA: 2024, by Last Menstrual Period.   CRB expelled  Continue pitocin per protocol  Anticipate vaginal delivery    Assessment & Plan  Gestational hypertension w/o significant proteinuria in 3rd trimester ([facility])    Pregnancy Problems (from 24 to present)       Problem Noted Resolved    Gestational hypertension w/o significant proteinuria in 3rd trimester ([facility]) 9/3/2024 by DANUTA Persaud No    Priority:  Medium      Gestational hypertension, third trimester ([facility]) 2024 by Brianne Fatima MD No    Priority:  Medium      Overview Addendum 2024  8:25 AM by Brianne Fatima MD     Single elevated pressure at 36.1 weeks with second at 37.1. Reviewed s/s of preeclampsia.   Home BP log is in target range.  36 week EFW 2797g, 45%.         Young primigravida in third trimester ([facility]) 3/13/2024 by Brianne Fatima MD No    Priority:  Medium      Overview Addendum 3/25/2024  3:51 PM by Brianne Fatima MD     Low risk pregnancy.   NIPS returned risk reducing. Normal 13 week early anatomy usn and NT.         38 weeks gestation of pregnancy ([facility]) 3/13/2024 by Brianne Fatima MD No    Priority:  Medium      Overview Addendum 2024  8:02 AM by Brianne Fatima MD     Glucola 122.  31 week EFW 1808g, 60%.          SGA (small for gestational age), fetal, affecting care of mother, antepartum ([facility]) 7/10/2024 by Brianne Fatima MD 2024 by Brianne Fatima MD    Priority:  Medium      Overview Addendum 2024 12:01 PM by Brianne Fatima MD     31 week EFW 1808g, 60%.                  Subjective   CRB expelled  Feeling more pain with contractions    Objective   Last Vitals:  Temp Pulse Resp BP MAP Pulse Ox   36.8 °C (98.2 °F) 76 16 107/59 76 99 %     Vitals Min/Max Last 24 Hours:  Temp  Min: 36.7 °C (98.1 °F)  Max: 37.2 °C (99 °F)  Pulse  Min: 76   Max: 122  Resp  Min: 16  Max: 18  BP  Min: 107/59  Max: 132/85  MAP (mmHg)  Min: 76  Max: 103    Intake/Output:  No intake or output data in the 24 hours ending 09/04/24 0051    Physical Examination:  [place]: contractions are irregular every 2 to 5 minutes  FHT: 130s, moderate variability, CAT 1 tracing  SVE: 6/70%/-2    Lab Review:  Labs in chart were reviewed.

## 2024-09-04 NOTE — PROGRESS NOTES
Intrapartum Progress Note    Subjective   Comfortable with epidural. Not feeling contractions at all    Objective   Last Vitals:  Temp Pulse Resp BP MAP Pulse Ox   36.4 °C (97.5 °F) 107 16 122/83   100 %     Physical Examination:  , mod grace, +accels, rare variable decels  [place]: q 2-3, pit @12mu/min  VE: def'd    Lab Review:  Lab Results   Component Value Date    WBC 10.3 2024    HGB 12.1 2024    HCT 36.7 2024     2024     Lab Results   Component Value Date    GLUCOSE 64 (L) 2024     2024    K 4.1 2024     2024    CO2 21 2024    ANIONGAP 13 2024    BUN 9 2024    CREATININE 0.57 2024    EGFR >90 2024    CALCIUM 8.9 2024    ALBUMIN 3.3 (L) 2024    PROT 5.6 (L) 2024    ALKPHOS 174 (H) 2024    ALT 15 2024    AST 21 2024    BILITOT 0.3 2024     0   Lab Value Date/Time    UTPCR 0.16 2024 1707    UTPCR 0.19 (H) 2024 1609     Assessment   Carol Ortez is a 29 y.o.  at 38w1d  IOL, active phase  Ghtn, stable  Fht cat 2, overall reassuring for variability and accels  Gbs negative    Plan   Continue current course  Anticipate

## 2024-09-04 NOTE — ANESTHESIA PREPROCEDURE EVALUATION
Patient: Carol Ortez    Evaluation Method: In-person visit    Procedure Information    Date: 09/04/24  Procedure: Labor Analgesia         Relevant Problems   Cardiac   (+) Gestational hypertension w/o significant proteinuria in 3rd trimester ([facility])      GYN   (+) 38 weeks gestation of pregnancy ([facility])       Clinical information reviewed:   Tobacco  Allergies  Meds   Med Hx  Surg Hx   Fam Hx  Soc Hx        NPO Detail:  No data recorded     OB/GYN     Physical Exam    Airway  Mallampati: II     Cardiovascular - normal exam     Dental - normal exam     Pulmonary - normal exam     Abdominal - normal exam             Anesthesia Plan    History of general anesthesia?: no  History of complications of general anesthesia?: no    ASA 2     epidural     The patient is not a current smoker.    Anesthetic plan and risks discussed with patient and spouse.

## 2024-09-04 NOTE — PROGRESS NOTES
Pt SROM for clear fluid around 1700.  Contractions are now painful and occurring every 2 minutes.   SVE 8/100/-1  Pt standing at bedside breathing through contractions.   Pt wishes to try nitrous

## 2024-09-04 NOTE — PROGRESS NOTES
Late entry    Discussion of options with patient and .   She is aggie now every 2-4 minutes and states they are worse than menstrual cramps but tolerable.   Discussed pros/cons of AROM vs continuing with pit alone at this time.     Pt wishes to continue pit only and will continue position changes. She would like NCB but is open to other options as labor progresses.    FHT's 145 with accels and moderate variability

## 2024-09-04 NOTE — ANESTHESIA PROCEDURE NOTES
Epidural Block    Patient location during procedure: OB  Start time: 9/4/2024 6:17 PM  End time: 9/4/2024 6:21 PM  Reason for block: labor analgesia  Staffing  Performed: CRNA   Authorized by: ROJAS Branham    Performed by: ROJAS Branham    Preanesthetic Checklist  Completed: patient identified, IV checked, risks and benefits discussed, surgical consent, pre-op evaluation, timeout performed and sterile techniques followed  Block Timeout  RN/Licensed healthcare professional reads aloud to the Anesthesia provider and entire team: Patient identity, procedure with side and site, patient position, and as applicable the availability of implants/special equipment/special requirements.  Patient on coagulant treatment: no  Timeout performed at: 9/4/2024 6:15 PM  Block Placement  Patient position: sitting  Prep: ChloraPrep  Sterility prep: mask, gloves, drape, cap and hand  Sedation level: no sedation  Patient monitoring: heart rate, continuous pulse oximetry and blood pressure  Approach: midline  Local numbing: lidocaine 1% to skin and subcutaneous tissues  Vertebral space: lumbar  Lumbar location: L3-L4  Epidural  Loss of resistance technique: saline  Guidance: landmark technique        Needle  Needle type: Tuohy   Needle gauge: 17  Needle length: 9 cm  Needle insertion depth: 6 cm  Catheter size: 19 G  Catheter at skin depth: 13 cm  Catheter securement method: clear occlusive dressing    Test dose: lidocaine 1.5% with epinephrine 1-to-200,000  Test dose: lidocaine 1.5% with epinephrine 1-to-200,000  Test dose result: no positive test dose    PCEA  Medication concentration used: 0.2% Ropivacaine with 2 mcg/mL Fentanyl  Dose (mL): 5  Lockout (minutes): 30  1-Hour Limit (boluses/hr): 2  Basal Rate: 12        Assessment  Sensory level: T10 bilateral  Block outcome: pain improved  Number of attempts: 1  Events: no positive test dose  Procedure assessment: patient tolerated procedure well with no  immediate complications

## 2024-09-04 NOTE — PROGRESS NOTES
Pt not feeling contractions.   Pit at 10mu  SVE 6/80/-1 posterior  Attempted AROM with no fluid return- will attempt with next check/ poss FSE   Ctxs- q2-3  FHT's 135 w early decels noted, accels present

## 2024-09-05 ENCOUNTER — PHARMACY VISIT (OUTPATIENT)
Dept: PHARMACY | Facility: CLINIC | Age: 29
End: 2024-09-05
Payer: COMMERCIAL

## 2024-09-05 PROCEDURE — 2500000005 HC RX 250 GENERAL PHARMACY W/O HCPCS: Performed by: OBSTETRICS & GYNECOLOGY

## 2024-09-05 PROCEDURE — 7210000002 HC LABOR PER HOUR

## 2024-09-05 PROCEDURE — 51701 INSERT BLADDER CATHETER: CPT

## 2024-09-05 PROCEDURE — 2500000004 HC RX 250 GENERAL PHARMACY W/ HCPCS (ALT 636 FOR OP/ED): Performed by: ADVANCED PRACTICE MIDWIFE

## 2024-09-05 PROCEDURE — 1100000001 HC PRIVATE ROOM DAILY

## 2024-09-05 PROCEDURE — 59050 FETAL MONITOR W/REPORT: CPT

## 2024-09-05 PROCEDURE — 2500000001 HC RX 250 WO HCPCS SELF ADMINISTERED DRUGS (ALT 637 FOR MEDICARE OP): Performed by: OBSTETRICS & GYNECOLOGY

## 2024-09-05 PROCEDURE — 88307 TISSUE EXAM BY PATHOLOGIST: CPT | Mod: TC,AHULAB | Performed by: OBSTETRICS & GYNECOLOGY

## 2024-09-05 PROCEDURE — 7100000016 HC LABOR RECOVERY PER HOUR

## 2024-09-05 PROCEDURE — 0KQM0ZZ REPAIR PERINEUM MUSCLE, OPEN APPROACH: ICD-10-PCS | Performed by: OBSTETRICS & GYNECOLOGY

## 2024-09-05 PROCEDURE — RXMED WILLOW AMBULATORY MEDICATION CHARGE

## 2024-09-05 PROCEDURE — 2500000004 HC RX 250 GENERAL PHARMACY W/ HCPCS (ALT 636 FOR OP/ED): Performed by: OBSTETRICS & GYNECOLOGY

## 2024-09-05 RX ORDER — MISOPROSTOL 200 UG/1
800 TABLET ORAL ONCE AS NEEDED
Status: DISCONTINUED | OUTPATIENT
Start: 2024-09-05 | End: 2024-09-06 | Stop reason: HOSPADM

## 2024-09-05 RX ORDER — CARBOPROST TROMETHAMINE 250 UG/ML
250 INJECTION, SOLUTION INTRAMUSCULAR ONCE AS NEEDED
Status: DISCONTINUED | OUTPATIENT
Start: 2024-09-05 | End: 2024-09-06 | Stop reason: HOSPADM

## 2024-09-05 RX ORDER — ADHESIVE BANDAGE
10 BANDAGE TOPICAL
Status: DISCONTINUED | OUTPATIENT
Start: 2024-09-05 | End: 2024-09-06 | Stop reason: HOSPADM

## 2024-09-05 RX ORDER — POLYETHYLENE GLYCOL 3350 17 G/17G
17 POWDER, FOR SOLUTION ORAL 2 TIMES DAILY PRN
Status: DISCONTINUED | OUTPATIENT
Start: 2024-09-05 | End: 2024-09-06 | Stop reason: HOSPADM

## 2024-09-05 RX ORDER — ONDANSETRON 4 MG/1
4 TABLET, FILM COATED ORAL EVERY 6 HOURS PRN
Status: DISCONTINUED | OUTPATIENT
Start: 2024-09-05 | End: 2024-09-06 | Stop reason: HOSPADM

## 2024-09-05 RX ORDER — OXYTOCIN 10 [USP'U]/ML
10 INJECTION, SOLUTION INTRAMUSCULAR; INTRAVENOUS ONCE AS NEEDED
Status: DISCONTINUED | OUTPATIENT
Start: 2024-09-05 | End: 2024-09-06 | Stop reason: HOSPADM

## 2024-09-05 RX ORDER — HYDRALAZINE HYDROCHLORIDE 20 MG/ML
5 INJECTION INTRAMUSCULAR; INTRAVENOUS ONCE AS NEEDED
Status: DISCONTINUED | OUTPATIENT
Start: 2024-09-05 | End: 2024-09-06 | Stop reason: HOSPADM

## 2024-09-05 RX ORDER — ONDANSETRON HYDROCHLORIDE 2 MG/ML
4 INJECTION, SOLUTION INTRAVENOUS EVERY 6 HOURS PRN
Status: DISCONTINUED | OUTPATIENT
Start: 2024-09-05 | End: 2024-09-06 | Stop reason: HOSPADM

## 2024-09-05 RX ORDER — LIDOCAINE 560 MG/1
1 PATCH PERCUTANEOUS; TOPICAL; TRANSDERMAL
Status: DISCONTINUED | OUTPATIENT
Start: 2024-09-05 | End: 2024-09-06 | Stop reason: HOSPADM

## 2024-09-05 RX ORDER — METHYLERGONOVINE MALEATE 0.2 MG/ML
0.2 INJECTION INTRAVENOUS ONCE AS NEEDED
Status: DISCONTINUED | OUTPATIENT
Start: 2024-09-05 | End: 2024-09-06 | Stop reason: HOSPADM

## 2024-09-05 RX ORDER — ACETAMINOPHEN 500 MG
1000 TABLET ORAL EVERY 6 HOURS PRN
Qty: 60 TABLET | Refills: 0 | Status: SHIPPED | OUTPATIENT
Start: 2024-09-05

## 2024-09-05 RX ORDER — LOPERAMIDE HYDROCHLORIDE 2 MG/1
4 CAPSULE ORAL EVERY 2 HOUR PRN
Status: DISCONTINUED | OUTPATIENT
Start: 2024-09-05 | End: 2024-09-06 | Stop reason: HOSPADM

## 2024-09-05 RX ORDER — OXYTOCIN/0.9 % SODIUM CHLORIDE 30/500 ML
60 PLASTIC BAG, INJECTION (ML) INTRAVENOUS ONCE AS NEEDED
Status: DISCONTINUED | OUTPATIENT
Start: 2024-09-05 | End: 2024-09-06 | Stop reason: HOSPADM

## 2024-09-05 RX ORDER — BISACODYL 10 MG/1
10 SUPPOSITORY RECTAL DAILY PRN
Status: DISCONTINUED | OUTPATIENT
Start: 2024-09-05 | End: 2024-09-06 | Stop reason: HOSPADM

## 2024-09-05 RX ORDER — NIFEDIPINE 10 MG/1
10 CAPSULE ORAL ONCE AS NEEDED
Status: DISCONTINUED | OUTPATIENT
Start: 2024-09-05 | End: 2024-09-06 | Stop reason: HOSPADM

## 2024-09-05 RX ORDER — IBUPROFEN 600 MG/1
600 TABLET ORAL EVERY 6 HOURS PRN
Qty: 30 TABLET | Refills: 0 | Status: SHIPPED | OUTPATIENT
Start: 2024-09-05

## 2024-09-05 RX ORDER — DIPHENHYDRAMINE HYDROCHLORIDE 50 MG/ML
25 INJECTION INTRAMUSCULAR; INTRAVENOUS EVERY 6 HOURS PRN
Status: DISCONTINUED | OUTPATIENT
Start: 2024-09-05 | End: 2024-09-06 | Stop reason: HOSPADM

## 2024-09-05 RX ORDER — TRANEXAMIC ACID 10 MG/ML
1000 INJECTION, SOLUTION INTRAVENOUS ONCE AS NEEDED
Status: DISCONTINUED | OUTPATIENT
Start: 2024-09-05 | End: 2024-09-06 | Stop reason: HOSPADM

## 2024-09-05 RX ORDER — LABETALOL HYDROCHLORIDE 5 MG/ML
20 INJECTION, SOLUTION INTRAVENOUS ONCE AS NEEDED
Status: DISCONTINUED | OUTPATIENT
Start: 2024-09-05 | End: 2024-09-06 | Stop reason: HOSPADM

## 2024-09-05 RX ORDER — ACETAMINOPHEN 325 MG/1
975 TABLET ORAL EVERY 6 HOURS
Status: DISCONTINUED | OUTPATIENT
Start: 2024-09-05 | End: 2024-09-06 | Stop reason: HOSPADM

## 2024-09-05 RX ORDER — DIPHENHYDRAMINE HCL 25 MG
25 CAPSULE ORAL EVERY 6 HOURS PRN
Status: DISCONTINUED | OUTPATIENT
Start: 2024-09-05 | End: 2024-09-06 | Stop reason: HOSPADM

## 2024-09-05 RX ORDER — SIMETHICONE 80 MG
80 TABLET,CHEWABLE ORAL 4 TIMES DAILY PRN
Status: DISCONTINUED | OUTPATIENT
Start: 2024-09-05 | End: 2024-09-06 | Stop reason: HOSPADM

## 2024-09-05 RX ORDER — IBUPROFEN 600 MG/1
600 TABLET ORAL EVERY 6 HOURS
Status: DISCONTINUED | OUTPATIENT
Start: 2024-09-05 | End: 2024-09-06 | Stop reason: HOSPADM

## 2024-09-05 ASSESSMENT — PAIN SCALES - GENERAL
PAINLEVEL_OUTOF10: 0 - NO PAIN
PAINLEVEL_OUTOF10: 0 - NO PAIN
PAIN_LEVEL: 2
PAINLEVEL_OUTOF10: 0 - NO PAIN
PAINLEVEL_OUTOF10: 0 - NO PAIN

## 2024-09-05 NOTE — LACTATION NOTE
Lactation Consultant Note  Lactation Consultation  Reason for Consult: Initial assessment  Consultant Name: Li HEWITT    Maternal Information  Has mother  before?: No  Infant to breast within first 2 hours of birth?: Yes  Exclusive Pump and Bottle Feed: No    Maternal Assessment  Breast Assessment: Medium, Soft, Compressible  Nipple Assessment: Intact, Erect  [place] Assessment: Normal    Infant Assessment  Infant Behavior: Sleepy  Infant Assessment:  (relucant to suck on gloved finger)    Feeding Assessment  Nutrition Source: Breastmilk  Feeding Method: Nursing at the breast    LATCH TOOL       Breast Pump       Other OB Lactation Tools       Patient Follow-up  Inpatient Lactation Follow-up Needed : Yes    Other OB Lactation Documentation       Recommendations/Summary  Mom reports a breastfeeding attempt. Baby was sleepy and not interested we tried once more during my visit to wake baby. Baby remained sleepy and was not interested at this time. Hand expression was demonstrated to mom. s drop of colostrum was offered to baby. However, baby continued to sleep. Reviewed milk supply patterns and  feeding patterns in the fist and second 24 HOL. Mom was asked to attempt/feed baby at least every 2-3 hours or on demand with a goal of 8-12 feeds daily. Mom asked to call for latch assistnce with feeds. Feeding cues reviewed. Breastfeeding education reviewed and questions answered. Mom aware of lactation support and asked to call out for feed assistance and with questions as needed.

## 2024-09-05 NOTE — ANESTHESIA POSTPROCEDURE EVALUATION
Patient: Carol Ortez    Procedure Summary       Date: 09/04/24 Room / Location:     Anesthesia Start: 1817 Anesthesia Stop: 2356    Procedure: Labor Analgesia Diagnosis:     Scheduled Providers:  Responsible Provider: ROJAS Branham    Anesthesia Type: epidural ASA Status: 2            Anesthesia Type: epidural    Vitals:    09/05/24 0338   BP: 128/69   Pulse: 94   Resp:    Temp:    SpO2:    Resp-14  SPO2- 98%  Temp-36.8      Anesthesia Post Evaluation    Patient location during evaluation: bedside  Patient participation: complete - patient participated  Level of consciousness: awake and alert  Pain score: 2  Pain management: adequate  Airway patency: patent  Cardiovascular status: acceptable  Respiratory status: acceptable  Hydration status: acceptable  Postoperative Nausea and Vomiting: none        No notable events documented.

## 2024-09-05 NOTE — L&D DELIVERY NOTE
OB Delivery Note  2024  Carol Ortez  29 y.o.   Vaginal, Spontaneous       Gestational Age: 38w2d  /Para:   Quantitative Blood Loss: Admission to Discharge: 0 mL (9/3/2024  1:40 PM - 2024 12:24 AM)    Adrián Ortez [[id number]]      Labor Events    Rupture date/time: 2024 1648  Rupture type: Spontaneous  Fluid color: Clear  Fluid odor: None  Complications: None       Labor Event Times    Labor onset date/time: 9/3/2024 1951  Dilation complete date/time: 2024  Start pushing date/time: 2024       Placenta    Placenta delivery date/time: 2024 0000  Placenta removal: Spontaneous  Placenta appearance: Intact  Placenta disposition: pathology       Cord    Vessels: 3 vessels  Complications: None  Delayed cord clamping?: Yes  Cord clamped date/time: 2024 23:58:00  Cord blood disposition: Lab  Gases sent?: No  Stem cell collection (by provider): No       Lacerations    Episiotomy: None  Perineal laceration: 2nd  Labial laceration?: Yes  Labial laceration location: bilateral  Labial laceration repaired?: Yes  Repair suture: 3-0 Synthetic Suture       Anesthesia    Method: Epidural       Operative Delivery    Forceps attempted?: No  Vacuum extractor attempted?: No       Shoulder Dystocia    Shoulder dystocia present?: No        Delivery    Time head delivered: 2024 23:55:50  Birth date/time: 2024 23:56:00  Delivery type: Vaginal, Spontaneous  Complications: None       Resuscitation    Method: Suctioning, Tactile stimulation       Apgars    Living status: Living  Apgar Component Scores:  1 min.:  5 min.:  10 min.:  15 min.:  20 min.:    Skin color:  1  1       Heart rate:  2  2       Reflex irritability:  2  2       Muscle tone:  2  2       Respiratory effort:  2  2       Total:  9  9       Apgars assigned by: NATAN HUGHES RN       Delivery Providers    Delivering clinician: Estefany Villalta MD   Provider Role    Letitia Rios RN Delivery  Nurse    Sailaja Rosa, RN Nursery Nurse     Resident             Patient became complete pushed for 2 and a half hour effectively and had  of a viable male over intact perineum.   She had IV antibiotics x 1 for fever 100.8 at the time of start of pushing.   Mom and baby stable.     Intrauterine Device Inserted : No, For pp outpatient IUD insertion    Estefany Villalta MD

## 2024-09-05 NOTE — PROGRESS NOTES
Postpartum Progress Note:    Subjective   29 y.o.  on day 1 postpartum  Feels well & happy. Has some perineal soreness  Breastfeeding well with LC support   Pain: using tylenol & motrin  Perineum: has not yet been given yulia bottle/dermaplast  + Void & flatus. Independent self-care    Objective    Last Vitals  Temp Pulse Resp BP MAP O2 Sat   36.4 °C (97.5 °F) 74 18 137/83   98 %     breasts soft, nontender, no s/s  nipple trauma  abdomen non-distended  fundus firm, u/1  Perineum well approximated  Lochia mod    Assessment/Plan   Carol Ortez is a 29 y.o., , who delivered at 38w1d gestation and is now postpartum day 1.  Gestational hypertension, stable and asymptomatic  Continue routine postpartum care  Pain well controlled on PO medications  Patient has been assessed to have a DVT risk score of 3, prophylactic lovenox not indicated  Patient is Rh Positive (Rh+).  Encouraged breastfeeding, lactation consult as needed  Contraception methods not discussed today  Bonding well with baby  Normal recovery & involution to date. Appropriate for discharge on PPD #3 if remains stable  Advised patient to follow up in 4-6 weeks with primary OB provider for routine postpartum visit    BK [place]-KOKO

## 2024-09-05 NOTE — PROGRESS NOTES
Patient comfortable with epidural  FHT good variabilities, reactive no decels category 1   CTX every 1-2 mins on Pitocin of 12 mu/min   Cervix 9/100%/0  Will recheck in 1-2 hours or as needed.   Anticipate  vaginal delivery.

## 2024-09-06 VITALS
DIASTOLIC BLOOD PRESSURE: 70 MMHG | HEART RATE: 77 BPM | HEIGHT: 67 IN | OXYGEN SATURATION: 99 % | RESPIRATION RATE: 16 BRPM | SYSTOLIC BLOOD PRESSURE: 105 MMHG | BODY MASS INDEX: 30.16 KG/M2 | TEMPERATURE: 98.4 F | WEIGHT: 192.13 LBS

## 2024-09-06 PROCEDURE — 99238 HOSP IP/OBS DSCHRG MGMT 30/<: CPT | Performed by: OBSTETRICS & GYNECOLOGY

## 2024-09-06 PROCEDURE — 2500000004 HC RX 250 GENERAL PHARMACY W/ HCPCS (ALT 636 FOR OP/ED): Performed by: OBSTETRICS & GYNECOLOGY

## 2024-09-06 ASSESSMENT — PAIN SCALES - GENERAL: PAINLEVEL_OUTOF10: 0 - NO PAIN

## 2024-09-06 NOTE — LACTATION NOTE
Lactation Consultant Note  Lactation Consultation  Reason for Consult: Follow-up assessment  Consultant Name: Li HEWITT    Maternal Information  Has mother  before?: No  Infant to breast within first 2 hours of birth?: Yes  Exclusive Pump and Bottle Feed: No    Maternal Assessment  Breast Assessment: Medium, Soft, Compressible  Nipple Assessment: Intact, Erect  Areola Assessment: Normal    Infant Assessment  Infant Behavior: Sleepy (S/P circ)  Infant Assessment: Good cupping of tongue    Feeding Assessment  Nutrition Source: Breastmilk  Feeding Method: Nursing at the breast  Feeding Position: Cross - cradle  Suck/Feeding: Sustained, Tactile stimulation needed  Latch Assessment: Minimal assistance is needed, Instructed on deep latch, Sucks with long jaw movement, Chin moves in rhythmic motion    LATCH TOOL  Latch: Grasps breast, tongue down, lips flanged, rhythmic sucking  Audible Swallowing: A few with stimulation  Type of Nipple: Everted (After stimulation)  Comfort (Breast/Nipple): Soft/non-tender  Hold (Positioning): Minimal assist, teach one side, mother does other, staff holds  LATCH Score: 8    Breast Pump       Other OB Lactation Tools       Patient Follow-up  Inpatient Lactation Follow-up Needed : No  Lactation Professional - OK to Discharge: Yes    Other OB Lactation Documentation       Recommendations/Summary  Assisted with latch to both sides. Baby was sleepy during attempt and during feed. Baby fed sucking with long jaw movement with a few swallows noted. baby needed lots of stimulation during feed. Baby is S/P circ. Baby appears to be able to latch well mom was shown hand expression to help stimulate milk flow before latch. Reviewed milk supply patterns and  feeding patterns in the fist and second 24 HOL.Mom was asked to attempt/feed baby at least every 2-3 hours or on demand with a goal of 8-12 feeds daily. Feeding cues reviewed.Breastfeeding education reviewed and questions answered.  Mom aware of lactation support and asked to call out for feed assistance and with questions as needed.

## 2024-09-06 NOTE — CARE PLAN
The patient's goals for the shift include  free from injury    The clinical goals for the shift include free from injury    Blood pressure cuff at home    Problem: Pain - Adult  Goal: Verbalizes/displays adequate comfort level or baseline comfort level  Outcome: Met     Problem: Safety - Adult  Goal: Free from fall injury  Outcome: Met     Problem: Postpartum  Goal: Experiences normal postpartum course  Outcome: Met  Goal: Appropriate maternal -  bonding  Outcome: Met  Goal: Establish and maintain infant feeding pattern for adequate nutrition  Outcome: Met  Goal: Incisions, wounds, or drain sites healing without S/S of infection  Outcome: Met  Goal: No s/sx infection  Outcome: Met  Goal: No s/sx of hemorrhage  Outcome: Met  Goal: Minimal s/sx of HDP and BP<160/110  Outcome: Met     Problem: Hypertensive Disorder of Pregnancy (HDP)  Goal: Minimal s/sx of HDP and BP<160/110  Outcome: Met  Goal: Adequate urine output (0.5 ml/kg/hr)  Outcome: Met     Problem: Discharge Planning  Goal: Discharge to home or other facility with appropriate resources  Outcome: Met

## 2024-09-06 NOTE — DISCHARGE SUMMARY
Discharge Summary    Admission Date: 9/3/2024  Discharge Date: 2024    Discharge Diagnosis  Gestational hypertension w/o significant proteinuria in 3rd trimester ([facility])    Hospital Course  Delivery Date: 2024 11:56 PM  Delivery type: Vaginal, Spontaneous   GA at delivery: 38w1d  Outcome: Living  Anesthesia during delivery: Epidural  Intrapartum complications: None  Feeding method: Breastfeeding Status: Yes     Procedures:         Last Vitals:  Temp Pulse Resp BP MAP Pulse Ox   36.9 °C (98.4 °F) 77 16 105/70 76 99 %     Discharge Meds     Your medication list        START taking these medications        Instructions Last Dose Given Next Dose Due   ibuprofen 600 mg tablet      Take 1 tablet (600 mg) by mouth every 6 hours if needed for mild pain (1 - 3).       Pain Relief ES (acetaminophen) 500 mg tablet  Generic drug: acetaminophen      Take 2 tablets (1,000 mg) by mouth every 6 hours if needed for mild pain (1 - 3).              CONTINUE taking these medications        Instructions Last Dose Given Next Dose Due   blood pressure monitor kit      1 Units 2 times a day.       Prenate DHA (ferr asp glycin) 18 mg iron-1 mg -300 mg capsule  Generic drug: prenatal 78-iron-folate 1-dha      Take 1 capsule by mouth once daily.              STOP taking these medications      doxylamine-pyridoxine (vit B6) 10-10 mg tablet,delayed release (DR/EC)        triamcinolone 0.025 % ointment  Commonly known as: Kenalog                  Where to Get Your Medications        These medications were sent to [facility]  [address] , [address], [facility] [address]      Hours: 8 AM to 6 PM Mon-Fri, 9 AM to 1 PM Saturday Phone: [phone]   ibuprofen 600 mg tablet  Pain Relief ES (acetaminophen) 500 mg tablet          Complications Requiring Follow-Up  Gestational hypertension    Test Results Pending At Discharge  Pending Labs       Order Current Status    Surgical Pathology Exam - PLACENTA Collected ([id number]  [id number])            Outpatient Follow-Up  Future Appointments   Date Time Provider Department Center   9/11/2024  9:00 AM [place] [address] [id number]   9/11/2024  9:40 AM Brianne Fatima MD [id number] [facility]   9/18/2024  2:10 PM Brianne Fatima MD [id number] [facility]         Tyrone Wang MD

## 2024-09-06 NOTE — PROGRESS NOTES
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  -Follow up in the office for BP check      Principal Problem:    Gestational hypertension w/o significant proteinuria in 3rd trimester ([facility])    Pregnancy Problems (from 24 to present)       Problem Noted Resolved    Gestational hypertension w/o significant proteinuria in 3rd trimester ([facility]) 9/3/2024 by BK Persaud-KOKO No    Priority:  Medium      Gestational hypertension, third trimester ([facility]) 2024 by Brianne Fatima MD No    Priority:  Medium      Overview Addendum 2024  8:25 AM by Brianne Fatima MD     Single elevated pressure at 36.1 weeks with second at 37.1. Reviewed s/s of preeclampsia.   Home BP log is in target range.  36 week EFW 2797g, 45%.         Young primigravida in third trimester ([facility]) 3/13/2024 by Brianne Fatima MD No    Priority:  Medium      Overview Addendum 3/25/2024  3:51 PM by Brianne Fatima MD     Low risk pregnancy.   NIPS returned risk reducing. Normal 13 week early anatomy usn and NT.         38 weeks gestation of pregnancy ([facility]) 3/13/2024 by Brianne Fatima MD No    Priority:  Medium      Overview Addendum 2024  8:02 AM by Brianne Fatima MD     Glucola 122.  31 week EFW 1808g, 60%.          SGA (small for gestational age), fetal, affecting care of mother, antepartum ([facility]) 7/10/2024 by Brianne Fatima MD 2024 by Brianne Fatima MD    Overview Addendum 2024 12:01 PM by Brianne Fatima MD     31 week EFW 1808g, 60%.                    Subjective   Patient is doing well postpartum.   Pain is well controlled.  She is ambulating and voiding without difficulty.  VB is wnl.  +Flatus.  Breastfeeding.         Objective   Allergies:   Patient has no known allergies.         Last Vitals:  Temp Pulse Resp BP MAP Pulse Ox   36.9 °C (98.4 °F) 77 16 105/70   99 %     Vitals Min/Max Last 24 Hours:  Temp  Min: 36.7 °C (98.1 °F)  Max: 37.6 °C (99.7 °F)  Pulse  Min: 69  Max: 88  Resp  Min: 16  Max: 16  BP  Min: 105/70  Max: 125/89    Intake/Output:     Intake/Output Summary (Last 24 hours) at 9/6/2024 1126  Last data filed at 9/5/2024 1300  Gross per 24 hour   Intake --   Output 150 ml   Net -150 ml       Physical Exam:  Gen:  Alert, well-nourished, NAD  ABD:  Fundus firm, below umbilicus  EXT:  Trace edema, no calf tenderness

## 2024-09-11 ENCOUNTER — APPOINTMENT (OUTPATIENT)
Dept: RADIOLOGY | Facility: CLINIC | Age: 29
End: 2024-09-11
Payer: COMMERCIAL

## 2024-09-11 ENCOUNTER — APPOINTMENT (OUTPATIENT)
Dept: OBSTETRICS AND GYNECOLOGY | Facility: CLINIC | Age: 29
End: 2024-09-11
Payer: COMMERCIAL

## 2024-09-11 VITALS — BODY MASS INDEX: 27.15 KG/M2 | WEIGHT: 173 LBS | SYSTOLIC BLOOD PRESSURE: 130 MMHG | DIASTOLIC BLOOD PRESSURE: 100 MMHG

## 2024-09-11 DIAGNOSIS — K64.0 GRADE I HEMORRHOIDS: Primary | ICD-10-CM

## 2024-09-11 RX ORDER — HYDROCORTISONE 25 MG/G
CREAM TOPICAL 2 TIMES DAILY
Qty: 28 G | Refills: 1 | Status: SHIPPED | OUTPATIENT
Start: 2024-09-11

## 2024-09-11 NOTE — PROGRESS NOTES
Postpartum Progress Note    Assessment/Plan   Carol Ortez is a 29 y.o.,  who presents for postpartum visit.   Pregnancy was complicated by gestational hypertension. Induction at 38 weeks ended in vaginal delivery.    She did not have elevated blood pressures during postpartum admission.   She is doing well since discharge.       Problem List       No episode was linked to this visit.          Hospital course: gestational hypertension       Subjective         She reports no breast or nursing problems  She denies emotional concerns today   Her plan for contraception is IUD w/progesterone           Allergies:   Patient has no known allergies.         Physical Exam:  Physical Exam  Constitutional:       Appearance: Normal appearance.   HENT:      Head: Normocephalic and atraumatic.   Pulmonary:      Effort: Pulmonary effort is normal.   Abdominal:      Palpations: Abdomen is soft.   Neurological:      General: No focal deficit present.      Mental Status: She is alert and oriented to person, place, and time.   Skin:     General: Skin is warm and dry.      Findings: No rash.   Psychiatric:         Mood and Affect: Mood normal.          Problem List Items Addressed This Visit       Routine postpartum follow-up ([facility]) - Primary    Overview     Pregnancy was complicated by gestational hypertension. Induction at 38 weeks ended in vaginal delivery.          Current Assessment & Plan     Patient is doing well postpartum. She was advised to slowly increase activity. Pelvic rest is recommended until 6 weeks postpartum. She was advised to continue prenatal vitamins and to assure adequate hydration.   She will continue to monitor BP at home.   Contraception was discussed.   She was advised to call the office with any concerning symptom, worsening of pain or bleeding, concern for postpartum depression or anxiety.   Follow up is planned in 4 weeks.

## 2024-09-11 NOTE — ASSESSMENT & PLAN NOTE
Patient is doing well postpartum. She was advised to slowly increase activity. Pelvic rest is recommended until 6 weeks postpartum. She was advised to continue prenatal vitamins and to assure adequate hydration.   She will continue to monitor BP at home.   Contraception was discussed.   She was advised to call the office with any concerning symptom, worsening of pain or bleeding, concern for postpartum depression or anxiety.   Follow up is planned in 4 weeks.

## 2024-09-12 LAB
LABORATORY COMMENT REPORT: NORMAL
PATH REPORT.FINAL DX SPEC: NORMAL
PATH REPORT.GROSS SPEC: NORMAL
PATH REPORT.RELEVANT HX SPEC: NORMAL
PATH REPORT.TOTAL CANCER: NORMAL

## 2024-09-18 ENCOUNTER — APPOINTMENT (OUTPATIENT)
Dept: OBSTETRICS AND GYNECOLOGY | Facility: CLINIC | Age: 29
End: 2024-09-18
Payer: COMMERCIAL

## 2024-09-18 NOTE — PROGRESS NOTES
Postpartum Progress Note    Assessment/Plan   Carol Ortez is a 29 y.o.,  presenting for postpartum visit. Pregnancy was complicated by gestational hypertension. Induction at 38 weeks ended in vaginal delivery 2024. BP has not been elevated postpartum. At postpartum visit last week she was doing well overall but had concerns for hemorrhoids.  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 She also notes some crying in the evenings when she has anxiety and sadness. The rest of the day she feels well and she does have support. We reviewed option of medication if this persists or worsens, but she declines this for now.   She notes slight itching at sutures on perineum.       Problem List       No episode was linked to this visit.          Hospital course: gestational hypertension       Subjective         She reports no breast or nursing problems  She reports mild depression today   Her plan for contraception is not discussed.           Allergies:   Patient has no known allergies.         Physical Exam:  Physical Exam  Constitutional:       Appearance: Normal appearance.   HENT:      Head: Normocephalic and atraumatic.   Pulmonary:      Effort: Pulmonary effort is normal.   Abdominal:      Palpations: Abdomen is soft.   Neurological:      General: No focal deficit present.      Mental Status: She is alert and oriented to person, place, and time.   Skin:     General: Skin is warm and dry.      Findings: No rash.   Psychiatric:         Mood and Affect: Mood normal.          Problem List Items Addressed This Visit       Routine postpartum follow-up ([facility]) - Primary    Overview     Pregnancy was complicated by gestational hypertension. Induction at 38 weeks ended in vaginal delivery.          Current Assessment & Plan     Patient is doing well postpartum. She was advised to slowly increase activity. Pelvic rest is recommended until 6 weeks postpartum. She was advised to continue prenatal  vitamins and to assure adequate hydration.   She will monitor moods and reach out if she feels depression is worsening.   She was advised to call the office with any concerning symptom, worsening of pain or bleeding, concern for postpartum depression or anxiety.   Follow up is planned in 4 weeks.

## 2024-09-19 ENCOUNTER — POSTPARTUM VISIT (OUTPATIENT)
Dept: OBSTETRICS AND GYNECOLOGY | Facility: CLINIC | Age: 29
End: 2024-09-19
Payer: COMMERCIAL

## 2024-09-19 VITALS
DIASTOLIC BLOOD PRESSURE: 76 MMHG | SYSTOLIC BLOOD PRESSURE: 118 MMHG | BODY MASS INDEX: 26.68 KG/M2 | WEIGHT: 170 LBS | HEIGHT: 67 IN

## 2024-09-19 PROBLEM — O13.3 GESTATIONAL HYPERTENSION, THIRD TRIMESTER (HHS-HCC): Status: RESOLVED | Noted: 2024-08-21 | Resolved: 2024-09-04

## 2024-09-19 PROBLEM — O09.613 YOUNG PRIMIGRAVIDA IN THIRD TRIMESTER (HHS-HCC): Status: RESOLVED | Noted: 2024-03-13 | Resolved: 2024-09-04

## 2024-09-19 PROBLEM — O13.3 GESTATIONAL HYPERTENSION W/O SIGNIFICANT PROTEINURIA IN 3RD TRIMESTER (HHS-HCC): Status: RESOLVED | Noted: 2024-09-03 | Resolved: 2024-09-04

## 2024-09-19 PROBLEM — Z3A.38 38 WEEKS GESTATION OF PREGNANCY (HHS-HCC): Status: RESOLVED | Noted: 2024-03-13 | Resolved: 2024-09-04

## 2024-09-19 PROCEDURE — 0503F POSTPARTUM CARE VISIT: CPT | Performed by: OBSTETRICS & GYNECOLOGY

## 2024-09-19 NOTE — LETTER
September 19, 2024     Carol Ortez  [address] [address]    Patient: Carol Ortez   YOB: 1995   Date of Visit: 9/19/2024       To Whom it may concern,       Carol is a patient under my care for postpartum .  She will be part time status for fall semester for postpartum recovery and infant bonding.  If you have any questions please don't hesitate to call.       Sincerely,     Brianne Fatima MD      CC: No Recipients  ______________________________________________________________________________________

## 2024-09-19 NOTE — ASSESSMENT & PLAN NOTE
Patient is doing well postpartum. She was advised to slowly increase activity. Pelvic rest is recommended until 6 weeks postpartum. She was advised to continue prenatal vitamins and to assure adequate hydration.   She will monitor moods and reach out if she feels depression is worsening.   She was advised to call the office with any concerning symptom, worsening of pain or bleeding, concern for postpartum depression or anxiety.   Follow up is planned in 4 weeks.

## 2024-10-11 NOTE — PROGRESS NOTES
Patient ID: Carol Ortez is a 29 y.o. female.    IUD Insertion    Performed by: Brianne Fatima MD  Authorized by: Brianne Fatima MD    Procedure: IUD insertion    Consent obtained by patient, parent, or legal power of  - including discussion of procedure risks and benefits, patient questions answered, and patient education provided: yes    Pregnancy risk: reasonably certain the patient is not pregnant    Date/Time of Insertion:  10/16/2024 12:19 PM  Immediately prior to procedure a time out was called: yes    Pelvic exam performed: yes    Speculum placed in vagina: yes    Cervix cleaned and prepped: yes    Tenaculum/Allis/Ring Forceps applied to cervix: yes    Anesthesia used: no    Uterus sound depth (cm):  9  Cervix manually dilated: no    IUD inserted without complications: yes    OSM: 52 mg levonorgestrel 21 mcg/24 hr (8 yrs) 52 mg  Patient tolerated procedure well: yes    Inserted with ultrasound guidance: no    Transvaginal sono confirmed fundal placement: no    Intended removal date: 8 years    Postpartum Progress Note    Assessment/Plan   Carol Ortez is a 29 y.o.,  presenting for postpartum visit. Pregnancy was complicated by gestational hypertension. Induction at 38 weeks ended in vaginal delivery 2024. BP has not been elevated postpartum. At last visit she was noting hemorrhoids and mild postpartum depression. She did have ASCUS with the presence of high risk HPV during pregnancy. Pap is planned today.        Problem List       No episode was linked to this visit.          Hospital course: gestational hypertension       Subjective         She reports no breast or nursing problems  She denies emotional concerns today   Her plan for contraception is IUD w/progesterone           Allergies:   Patient has no known allergies.         Physical Exam:  Physical Exam  Constitutional:       Appearance: Normal appearance.   Genitourinary:      Bladder, rectum and  urethral meatus normal.      Right Labia: No rash or lesions.     Left Labia: No lesions or rash.     No vaginal discharge.      No vaginal prolapse present.     No vaginal atrophy present.       Right Adnexa: not tender and no mass present.     Left Adnexa: not tender and no mass present.     No cervical discharge or lesion.      Uterus is not enlarged or tender.      Pelvic exam was performed with patient in the lithotomy position.   HENT:      Head: Normocephalic and atraumatic.      Nose: Nose normal.   Cardiovascular:      Rate and Rhythm: Normal rate and regular rhythm.      Heart sounds: Normal heart sounds.   Pulmonary:      Effort: Pulmonary effort is normal.      Breath sounds: Normal breath sounds.   Abdominal:      Palpations: Abdomen is soft.   Musculoskeletal:      Cervical back: Neck supple.   Neurological:      General: No focal deficit present.      Mental Status: She is alert and oriented to person, place, and time.   Skin:     General: Skin is warm and dry.      Findings: No rash.   Psychiatric:         Mood and Affect: Mood normal.          Problem List Items Addressed This Visit       Routine postpartum follow-up    Overview     Pregnancy was complicated by gestational hypertension. Induction at 38 weeks ended in vaginal delivery.          Current Assessment & Plan     Patient is doing well postpartum. She may return to normal activity. She was advised to continue prenatal vitamins and to assure adequate hydration.   Contraception was discussed. Mirena IUD is inserted.   She was advised to call the office with any concerning symptom, worsening of pain or bleeding, concern for postpartum depression or anxiety.   Follow up is planned  in 1 month for IUD check.          Encounter for insertion of Mirena IUD    Overview     Mirena IUD is inserted on 10/16/2024.         ASCUS with positive high risk HPV cervical - Primary    Overview     Pap 3/13/2024 returned with ASCUS, HPV present when she was 13  weeks gestation.  Colposcopy is performed 4/17/2024 with small area of AWE at 11:00.         Current Assessment & Plan     Pap is sent today. Plan colposcopy if abnormal.   She will consider Gardisil vaccine.          Relevant Orders    THINPREP PAP

## 2024-10-11 NOTE — ASSESSMENT & PLAN NOTE
Patient is doing well postpartum. She may return to normal activity. She was advised to continue prenatal vitamins and to assure adequate hydration.   Contraception was discussed. Mirena IUD is inserted.   She was advised to call the office with any concerning symptom, worsening of pain or bleeding, concern for postpartum depression or anxiety.   Follow up is planned  in 1 month for IUD check.

## 2024-10-16 ENCOUNTER — APPOINTMENT (OUTPATIENT)
Dept: OBSTETRICS AND GYNECOLOGY | Facility: CLINIC | Age: 29
End: 2024-10-16

## 2024-10-16 VITALS — BODY MASS INDEX: 26.47 KG/M2 | DIASTOLIC BLOOD PRESSURE: 82 MMHG | WEIGHT: 169 LBS | SYSTOLIC BLOOD PRESSURE: 118 MMHG

## 2024-10-16 DIAGNOSIS — R87.810 ASCUS WITH POSITIVE HIGH RISK HPV CERVICAL: Primary | ICD-10-CM

## 2024-10-16 DIAGNOSIS — Z30.430 ENCOUNTER FOR INSERTION OF MIRENA IUD: ICD-10-CM

## 2024-10-16 DIAGNOSIS — R87.610 ASCUS WITH POSITIVE HIGH RISK HPV CERVICAL: Primary | ICD-10-CM

## 2024-10-16 PROCEDURE — 87591 N.GONORRHOEAE DNA AMP PROB: CPT

## 2024-10-16 PROCEDURE — 87491 CHLMYD TRACH DNA AMP PROBE: CPT

## 2024-10-16 ASSESSMENT — EDINBURGH POSTNATAL DEPRESSION SCALE (EPDS)
I HAVE BLAMED MYSELF UNNECESSARILY WHEN THINGS WENT WRONG: YES, MOST OF THE TIME
I HAVE FELT SCARED OR PANICKY FOR NO GOOD REASON: YES, SOMETIMES
I HAVE BEEN ABLE TO LAUGH AND SEE THE FUNNY SIDE OF THINGS: NOT QUITE SO MUCH NOW
I HAVE FELT SAD OR MISERABLE: NOT VERY OFTEN
THINGS HAVE BEEN GETTING ON TOP OF ME: YES, SOMETIMES I HAVEN'T BEEN COPING AS WELL AS USUAL
TOTAL SCORE: 14
I HAVE BEEN ANXIOUS OR WORRIED FOR NO GOOD REASON: HARDLY EVER
I HAVE BEEN SO UNHAPPY THAT I HAVE BEEN CRYING: ONLY OCCASIONALLY
I HAVE LOOKED FORWARD WITH ENJOYMENT TO THINGS: RATHER LESS THAN I USED TO
THE THOUGHT OF HARMING MYSELF HAS OCCURRED TO ME: NEVER
I HAVE BEEN SO UNHAPPY THAT I HAVE HAD DIFFICULTY SLEEPING: YES, SOMETIMES

## 2024-10-17 LAB
C TRACH RRNA SPEC QL NAA+PROBE: NEGATIVE
N GONORRHOEA DNA SPEC QL PROBE+SIG AMP: NEGATIVE

## 2024-10-30 LAB
CYTOLOGY CMNT CVX/VAG CYTO-IMP: NORMAL
LAB AP HPV GENOTYPE QUESTION: YES
LAB AP HPV HR: NORMAL
LAB AP PAP ADDITIONAL TESTS: NORMAL
LAB AP PREVIOUS ABNORMAL HISTORY: NORMAL
LABORATORY COMMENT REPORT: NORMAL
MENSTRUAL HX REPORTED: NORMAL
PATH REPORT.TOTAL CANCER: NORMAL

## 2024-11-10 NOTE — PROGRESS NOTES
Subjective   Patient ID: Carol Ortez is a 29 y.o. female who presents for Colposcopy (IUD check, hpv vaccine).  She presents for gyn visit.  Pap 3/13/2024 returned with ASCUS, HPV present when she was 13 weeks gestation.  Colposcopy is performed 4/17/2024 with small area of AWE at 11:00.  10/16/2024 LGSIL pap.  Colposcopy is indicated again today. She is also interested in HPV vaccination. They have confirmed insurance coverage for this.     Mirena IUD was inserted at postpartum visit on 10/16/2024.          Review of Systems   Constitutional:  Negative for activity change.   HENT:  Negative for congestion.    Respiratory:  Negative for apnea and cough.    Cardiovascular:  Negative for chest pain.   Gastrointestinal:  Negative for constipation and diarrhea.   Genitourinary:  Negative for hematuria and vaginal pain.   Musculoskeletal:  Negative for joint swelling.   Neurological:  Negative for dizziness.   Psychiatric/Behavioral:  Negative for agitation.        Past Medical History:   Diagnosis Date    SGA (small for gestational age), fetal, affecting care of mother, antepartum ([facility]) 07/10/2024    31 week EFW 1808g, 60%.         History reviewed. No pertinent surgical history.   No Known Allergies   Current Outpatient Medications on File Prior to Visit   Medication Sig Dispense Refill    levonorgestrel (Mirena) 20 mcg/24hr IUD 52 mg by intrauterine route 1 time. Inserted 10/16/2024      prenatal 78-iron-folate 1-dha (Prenate DHA, ferr asp glycin,) 18 mg iron-1 mg -300 mg capsule Take 1 capsule by mouth once daily. 90 capsule 3    acetaminophen (Tylenol) 500 mg tablet Take 2 tablets (1,000 mg) by mouth every 6 hours if needed for mild pain (1 - 3). (Patient not taking: Reported on 11/20/2024) 60 tablet 0    blood pressure monitor kit 1 Units 2 times a day. (Patient not taking: Reported on 11/20/2024) 1 kit 0    hydrocortisone (Anusol-HC) 2.5 % rectal cream Insert into the rectum 2 times a day. (Patient  not taking: Reported on 11/20/2024) 28 g 1    ibuprofen 600 mg tablet Take 1 tablet (600 mg) by mouth every 6 hours if needed for mild pain (1 - 3). (Patient not taking: Reported on 11/20/2024) 30 tablet 0     No current facility-administered medications on file prior to visit.        Objective   Physical Exam  Constitutional:       Appearance: Normal appearance.   Pulmonary:      Effort: Pulmonary effort is normal.   Abdominal:      Palpations: Abdomen is soft.   Genitourinary:     General: Normal vulva.      Exam position: Lithotomy position.      Labia:         Right: No lesion.         Left: No lesion.       Urethra: No urethral lesion.      Vagina: Normal. No lesions.      Cervix: No friability or lesion.      Uterus: Normal. Not enlarged and not tender.       Adnexa: Right adnexa normal and left adnexa normal.        Right: No mass or tenderness.          Left: No mass or tenderness.        Comments: IUD strings are seen.  Skin:     General: Skin is warm and dry.   Neurological:      Mental Status: She is alert.     Patient ID: Carol Ortez is a 29 y.o. female.    Colposcopy    Date/Time: 11/20/2024 9:27 AM    Performed by: Brianne Fatima MD  Authorized by: Brianne Fatima MD    Procedure location: cervix    Consent:     Patient questions answered: yes      Risks and benefits of the procedure and its alternatives discussed: yes      Procedural risks discussed:  Bleeding, infection and repeat procedure    Consent obtained:  Written    Consent given by:  Patient  Indication:     Cervical indication(s): cervical LSIL    Pre-procedure:     Speculum was placed in the vagina: yes      Prep solution(s): acetic acid    Procedure:     Colposcopy with: endocervical curettage      Biopsy taken: no      Colposcopy details:  Normal appearing cervix with IUD string seen.    Cervix visibility: fully visualized      SCJ visibility: fully visualized    Post-procedure:     Patient tolerance of procedure:   Patient tolerated the procedure well with no immediate complications        Problem List Items Addressed This Visit       LGSIL on Pap smear of cervix    Overview     Pap 3/13/2024 returned with ASCUS, HPV present when she was 13 weeks gestation.  Colposcopy is performed 4/17/2024 with small area of AWE at 11:00.  10/16/2024 LGSIL pap.         Current Assessment & Plan     Colposcopy was performed today. We will await pathology report from biopsies. If there is no evidence of moderate or severe dysplasia on pathology we will plan to repeat pap in 12 months.   HPV vaccination may be beneficial in prevention and treatment of cervical dysplasia.  Using a condom may help prevent exposure to new HPV strains.  Avoiding nicotine exposure and adopting healthy habits such as regular exercise and a healthy diet are also recommended.           Encounter for insertion of Mirena IUD - Primary    Overview     Mirena IUD is inserted on 10/16/2024.         Current Assessment & Plan     She is doing well. Follow up as needed.

## 2024-11-20 ENCOUNTER — APPOINTMENT (OUTPATIENT)
Dept: OBSTETRICS AND GYNECOLOGY | Facility: CLINIC | Age: 29
End: 2024-11-20
Payer: COMMERCIAL

## 2024-11-20 VITALS — BODY MASS INDEX: 25.06 KG/M2 | DIASTOLIC BLOOD PRESSURE: 82 MMHG | SYSTOLIC BLOOD PRESSURE: 110 MMHG | WEIGHT: 160 LBS

## 2024-11-20 DIAGNOSIS — Z30.430 ENCOUNTER FOR INSERTION OF MIRENA IUD: Primary | ICD-10-CM

## 2024-11-20 DIAGNOSIS — Z23 NEED FOR HPV VACCINE: ICD-10-CM

## 2024-11-20 DIAGNOSIS — R87.612 LGSIL ON PAP SMEAR OF CERVIX: ICD-10-CM

## 2024-11-20 PROCEDURE — 1036F TOBACCO NON-USER: CPT | Performed by: OBSTETRICS & GYNECOLOGY

## 2024-11-20 PROCEDURE — 57456 ENDOCERV CURETTAGE W/SCOPE: CPT | Performed by: OBSTETRICS & GYNECOLOGY

## 2024-11-20 PROCEDURE — 99213 OFFICE O/P EST LOW 20 MIN: CPT | Performed by: OBSTETRICS & GYNECOLOGY

## 2024-11-20 PROCEDURE — 90471 IMMUNIZATION ADMIN: CPT | Performed by: OBSTETRICS & GYNECOLOGY

## 2024-11-20 PROCEDURE — 90651 9VHPV VACCINE 2/3 DOSE IM: CPT | Performed by: OBSTETRICS & GYNECOLOGY

## 2024-11-20 ASSESSMENT — ENCOUNTER SYMPTOMS
APNEA: 0
CONSTIPATION: 0
DIZZINESS: 0
ACTIVITY CHANGE: 0
AGITATION: 0
JOINT SWELLING: 0
HEMATURIA: 0
COUGH: 0
DIARRHEA: 0

## 2024-11-20 NOTE — ASSESSMENT & PLAN NOTE
Colposcopy was performed today. We will await pathology report from biopsies. If there is no evidence of moderate or severe dysplasia on pathology we will plan to repeat pap in 12 months.   HPV vaccination may be beneficial in prevention and treatment of cervical dysplasia.  Using a condom may help prevent exposure to new HPV strains.  Avoiding nicotine exposure and adopting healthy habits such as regular exercise and a healthy diet are also recommended.

## 2024-12-09 LAB
LABORATORY COMMENT REPORT: NORMAL
PATH REPORT.FINAL DX SPEC: NORMAL
PATH REPORT.GROSS SPEC: NORMAL
PATH REPORT.RELEVANT HX SPEC: NORMAL
PATH REPORT.TOTAL CANCER: NORMAL
RESIDENT REVIEW: NORMAL

## 2025-01-07 ENCOUNTER — APPOINTMENT (OUTPATIENT)
Dept: OBSTETRICS AND GYNECOLOGY | Facility: CLINIC | Age: 30
End: 2025-01-07
Payer: COMMERCIAL

## 2025-01-20 ENCOUNTER — APPOINTMENT (OUTPATIENT)
Dept: OBSTETRICS AND GYNECOLOGY | Facility: CLINIC | Age: 30
End: 2025-01-20
Payer: COMMERCIAL

## 2025-03-04 DIAGNOSIS — O36.80X0 PREGNANCY WITH INCONCLUSIVE FETAL VIABILITY, SINGLE OR UNSPECIFIED FETUS: ICD-10-CM

## 2025-03-04 RX ORDER — ASCORBIC ACID, CHOLECALCIFEROL, .ALPHA.-TOCOPHEROL, DL-, FOLIC ACID, PYRIDOXINE HYDROCHLORIDE, CYANOCOBALAMIN, CALCIUM FORMATE, FERROUS ASPARTO GLYCINATE, MAGNESIUM OXIDE AND DOCONEXENT 90; 400; 40; 1; 26; 25; 155; 18; 50; 300 MG/1; [IU]/1; [IU]/1; MG/1; MG/1; UG/1; MG/1; MG/1; MG/1; MG/1
1 CAPSULE, GELATIN COATED ORAL DAILY
Qty: 90 CAPSULE | Refills: 3 | Status: SHIPPED | OUTPATIENT
Start: 2025-03-04